# Patient Record
Sex: FEMALE | Race: WHITE | Employment: OTHER | URBAN - METROPOLITAN AREA
[De-identification: names, ages, dates, MRNs, and addresses within clinical notes are randomized per-mention and may not be internally consistent; named-entity substitution may affect disease eponyms.]

---

## 2017-01-01 ENCOUNTER — HOSPITAL ENCOUNTER (OUTPATIENT)
Dept: NON INVASIVE DIAGNOSTICS | Age: 82
Discharge: HOME | End: 2017-05-12
Payer: MEDICARE

## 2017-01-01 ENCOUNTER — HOSPITAL ENCOUNTER (OUTPATIENT)
Dept: LAB | Age: 82
Discharge: HOME | End: 2017-06-06
Payer: MEDICARE

## 2017-01-01 ENCOUNTER — ANESTHESIA (OUTPATIENT)
Dept: ENDOSCOPY | Age: 82
End: 2017-01-01
Payer: MEDICARE

## 2017-01-01 ENCOUNTER — OFFICE VISIT (OUTPATIENT)
Dept: CARDIOLOGY CLINIC | Age: 82
End: 2017-01-01

## 2017-01-01 ENCOUNTER — ANESTHESIA EVENT (OUTPATIENT)
Dept: ENDOSCOPY | Age: 82
End: 2017-01-01
Payer: MEDICARE

## 2017-01-01 ENCOUNTER — HOSPITAL ENCOUNTER (OUTPATIENT)
Dept: CARDIAC REHAB | Age: 82
Discharge: HOME | End: 2017-05-30
Payer: MEDICARE

## 2017-01-01 ENCOUNTER — HOSPITAL ENCOUNTER (OUTPATIENT)
Age: 82
Setting detail: OUTPATIENT SURGERY
Discharge: HOME | End: 2017-05-04
Attending: INTERNAL MEDICINE | Admitting: INTERNAL MEDICINE
Payer: MEDICARE

## 2017-01-01 ENCOUNTER — TELEPHONE (OUTPATIENT)
Dept: CARDIOLOGY CLINIC | Age: 82
End: 2017-01-01

## 2017-01-01 ENCOUNTER — TELEPHONE (OUTPATIENT)
Dept: CARDIAC REHAB | Age: 82
End: 2017-01-01

## 2017-01-01 ENCOUNTER — HOSPITAL ENCOUNTER (OUTPATIENT)
Dept: CARDIAC REHAB | Age: 82
Discharge: HOME | End: 2017-06-28
Payer: MEDICARE

## 2017-01-01 ENCOUNTER — HOSPITAL ENCOUNTER (OUTPATIENT)
Dept: CARDIAC REHAB | Age: 82
Discharge: HOME | End: 2017-06-14
Payer: MEDICARE

## 2017-01-01 ENCOUNTER — HOSPITAL ENCOUNTER (OUTPATIENT)
Dept: NON INVASIVE DIAGNOSTICS | Age: 82
Discharge: HOME | End: 2017-04-28
Attending: FAMILY MEDICINE

## 2017-01-01 ENCOUNTER — HOSPITAL ENCOUNTER (OUTPATIENT)
Dept: GENERAL RADIOLOGY | Age: 82
Discharge: HOME | DRG: 251 | End: 2017-05-15
Payer: MEDICARE

## 2017-01-01 ENCOUNTER — HOSPITAL ENCOUNTER (INPATIENT)
Dept: CARDIAC CATH/INVASIVE PROCEDURES | Age: 82
LOS: 2 days | Discharge: HOME | DRG: 251 | End: 2017-05-19
Attending: INTERNAL MEDICINE | Admitting: INTERNAL MEDICINE
Payer: MEDICARE

## 2017-01-01 ENCOUNTER — HOSPITAL ENCOUNTER (OUTPATIENT)
Dept: CARDIAC REHAB | Age: 82
Discharge: HOME | End: 2017-06-07
Payer: MEDICARE

## 2017-01-01 ENCOUNTER — HOSPITAL ENCOUNTER (OUTPATIENT)
Dept: CARDIAC REHAB | Age: 82
Discharge: HOME | End: 2017-06-05
Payer: MEDICARE

## 2017-01-01 ENCOUNTER — HOSPITAL ENCOUNTER (OUTPATIENT)
Dept: CARDIAC REHAB | Age: 82
Discharge: HOME | End: 2017-06-12
Payer: MEDICARE

## 2017-01-01 ENCOUNTER — HOSPITAL ENCOUNTER (OUTPATIENT)
Dept: CT IMAGING | Age: 82
Discharge: HOME | End: 2017-03-06
Attending: OBSTETRICS & GYNECOLOGY
Payer: MEDICARE

## 2017-01-01 ENCOUNTER — HOSPITAL ENCOUNTER (OUTPATIENT)
Dept: CARDIAC REHAB | Age: 82
Discharge: HOME | End: 2017-06-19
Payer: MEDICARE

## 2017-01-01 ENCOUNTER — TELEPHONE (OUTPATIENT)
Dept: PULMONOLOGY | Age: 82
End: 2017-01-01

## 2017-01-01 ENCOUNTER — SURGERY (OUTPATIENT)
Age: 82
End: 2017-01-01

## 2017-01-01 ENCOUNTER — HOSPITAL ENCOUNTER (OUTPATIENT)
Dept: LAB | Age: 82
Discharge: HOME | DRG: 251 | End: 2017-05-15
Payer: MEDICARE

## 2017-01-01 ENCOUNTER — HOSPITAL ENCOUNTER (OUTPATIENT)
Dept: CARDIAC REHAB | Age: 82
Discharge: HOME | End: 2017-06-21
Payer: MEDICARE

## 2017-01-01 ENCOUNTER — HOSPITAL ENCOUNTER (OUTPATIENT)
Dept: CARDIAC REHAB | Age: 82
Discharge: HOME | End: 2017-06-26
Payer: MEDICARE

## 2017-01-01 VITALS
DIASTOLIC BLOOD PRESSURE: 64 MMHG | WEIGHT: 145 LBS | HEIGHT: 62 IN | BODY MASS INDEX: 26.68 KG/M2 | OXYGEN SATURATION: 96 % | SYSTOLIC BLOOD PRESSURE: 130 MMHG | HEART RATE: 81 BPM

## 2017-01-01 VITALS
WEIGHT: 144 LBS | DIASTOLIC BLOOD PRESSURE: 68 MMHG | HEART RATE: 58 BPM | OXYGEN SATURATION: 98 % | HEIGHT: 62 IN | BODY MASS INDEX: 26.5 KG/M2 | SYSTOLIC BLOOD PRESSURE: 108 MMHG

## 2017-01-01 VITALS
HEIGHT: 62 IN | HEART RATE: 70 BPM | DIASTOLIC BLOOD PRESSURE: 64 MMHG | TEMPERATURE: 98.7 F | RESPIRATION RATE: 18 BRPM | OXYGEN SATURATION: 92 % | WEIGHT: 154.2 LBS | BODY MASS INDEX: 28.37 KG/M2 | SYSTOLIC BLOOD PRESSURE: 100 MMHG

## 2017-01-01 VITALS
WEIGHT: 141 LBS | BODY MASS INDEX: 25.95 KG/M2 | OXYGEN SATURATION: 100 % | HEIGHT: 62 IN | SYSTOLIC BLOOD PRESSURE: 133 MMHG | RESPIRATION RATE: 19 BRPM | TEMPERATURE: 98.1 F | HEART RATE: 63 BPM | DIASTOLIC BLOOD PRESSURE: 58 MMHG

## 2017-01-01 DIAGNOSIS — C56.1 OVARIAN CANCER ON RIGHT (HCC): ICD-10-CM

## 2017-01-01 DIAGNOSIS — R94.31 ABNORMAL EKG: ICD-10-CM

## 2017-01-01 DIAGNOSIS — R94.39 ABNORMAL NUCLEAR STRESS TEST: ICD-10-CM

## 2017-01-01 DIAGNOSIS — I42.9 CARDIOMYOPATHY, UNSPECIFIED TYPE (HCC): ICD-10-CM

## 2017-01-01 DIAGNOSIS — I10 ESSENTIAL HYPERTENSION: ICD-10-CM

## 2017-01-01 DIAGNOSIS — R06.09 DYSPNEA ON EXERTION: ICD-10-CM

## 2017-01-01 DIAGNOSIS — Z01.812 PRE-PROCEDURE LAB EXAM: ICD-10-CM

## 2017-01-01 DIAGNOSIS — I25.5 ISCHEMIC CARDIOMYOPATHY: ICD-10-CM

## 2017-01-01 DIAGNOSIS — I20.0 UNSTABLE ANGINA (HCC): Primary | ICD-10-CM

## 2017-01-01 DIAGNOSIS — I42.9 CARDIOMYOPATHY, UNSPECIFIED TYPE (HCC): Primary | ICD-10-CM

## 2017-01-01 DIAGNOSIS — I25.10 CORONARY ARTERY DISEASE INVOLVING NATIVE HEART, ANGINA PRESENCE UNSPECIFIED, UNSPECIFIED VESSEL OR LESION TYPE: ICD-10-CM

## 2017-01-01 DIAGNOSIS — R07.9 CHEST PAIN, EXERTIONAL: ICD-10-CM

## 2017-01-01 DIAGNOSIS — I20.0 UNSTABLE ANGINA (HCC): ICD-10-CM

## 2017-01-01 DIAGNOSIS — R19.04 ABDOMINAL OR PELVIC SWELLING, MASS, OR LUMP, LEFT LOWER QUADRANT: ICD-10-CM

## 2017-01-01 DIAGNOSIS — R94.39 ABNORMAL NUCLEAR STRESS TEST: Primary | ICD-10-CM

## 2017-01-01 DIAGNOSIS — E78.5 HYPERLIPIDEMIA, UNSPECIFIED HYPERLIPIDEMIA TYPE: ICD-10-CM

## 2017-01-01 DIAGNOSIS — I25.118 CORONARY ARTERY DISEASE OF NATIVE ARTERY OF NATIVE HEART WITH STABLE ANGINA PECTORIS (HCC): ICD-10-CM

## 2017-01-01 LAB
ALBUMIN SERPL BCP-MCNC: 3.9 G/DL (ref 3.4–5)
ALBUMIN/GLOB SERPL: 1.2 {RATIO} (ref 0.8–1.7)
ALP SERPL-CCNC: 86 U/L (ref 45–117)
ALT SERPL-CCNC: 20 U/L (ref 13–56)
ANION GAP BLD CALC-SCNC: 10 MMOL/L (ref 3–18)
ANION GAP BLD CALC-SCNC: 10 MMOL/L (ref 3–18)
ANION GAP BLD CALC-SCNC: 11 MMOL/L (ref 3–18)
ANION GAP BLD CALC-SCNC: 7 MMOL/L (ref 3–18)
ANION GAP BLD CALC-SCNC: 8 MMOL/L (ref 3–18)
APTT PPP: 53 SEC (ref 23–36.4)
APTT PPP: 95.2 SEC (ref 23–36.4)
APTT PPP: 95.9 SEC (ref 23–36.4)
AST SERPL W P-5'-P-CCNC: 23 U/L (ref 15–37)
ATRIAL RATE: 67 BPM
ATRIAL RATE: 70 BPM
ATRIAL RATE: 91 BPM
ATTENDING PHYSICIAN, CST07: NORMAL
BASOPHILS # BLD AUTO: 0 K/UL (ref 0–0.06)
BASOPHILS # BLD AUTO: 0.1 K/UL (ref 0–0.1)
BASOPHILS # BLD: 0 % (ref 0–2)
BASOPHILS # BLD: 1 % (ref 0–2)
BILIRUB SERPL-MCNC: 0.5 MG/DL (ref 0.2–1)
BUN SERPL-MCNC: 15 MG/DL (ref 7–18)
BUN SERPL-MCNC: 20 MG/DL (ref 7–18)
BUN SERPL-MCNC: 21 MG/DL (ref 7–18)
BUN SERPL-MCNC: 22 MG/DL (ref 7–18)
BUN SERPL-MCNC: 25 MG/DL (ref 7–18)
BUN/CREAT SERPL: 15 (ref 12–20)
BUN/CREAT SERPL: 16 (ref 12–20)
BUN/CREAT SERPL: 17 (ref 12–20)
CALCIUM SERPL-MCNC: 9 MG/DL (ref 8.5–10.1)
CALCIUM SERPL-MCNC: 9.3 MG/DL (ref 8.5–10.1)
CALCIUM SERPL-MCNC: 9.3 MG/DL (ref 8.5–10.1)
CALCIUM SERPL-MCNC: 9.7 MG/DL (ref 8.5–10.1)
CALCIUM SERPL-MCNC: 9.9 MG/DL (ref 8.5–10.1)
CALCULATED P AXIS, ECG09: 39 DEGREES
CALCULATED P AXIS, ECG09: 46 DEGREES
CALCULATED P AXIS, ECG09: 46 DEGREES
CALCULATED R AXIS, ECG10: 29 DEGREES
CALCULATED R AXIS, ECG10: 65 DEGREES
CALCULATED R AXIS, ECG10: 7 DEGREES
CALCULATED T AXIS, ECG11: -83 DEGREES
CALCULATED T AXIS, ECG11: 151 DEGREES
CALCULATED T AXIS, ECG11: 154 DEGREES
CHLORIDE SERPL-SCNC: 104 MMOL/L (ref 100–108)
CHLORIDE SERPL-SCNC: 105 MMOL/L (ref 100–108)
CHLORIDE SERPL-SCNC: 107 MMOL/L (ref 100–108)
CHLORIDE SERPL-SCNC: 108 MMOL/L (ref 100–108)
CHLORIDE SERPL-SCNC: 109 MMOL/L (ref 100–108)
CHOLEST SERPL-MCNC: 198 MG/DL
CO2 SERPL-SCNC: 19 MMOL/L (ref 21–32)
CO2 SERPL-SCNC: 22 MMOL/L (ref 21–32)
CO2 SERPL-SCNC: 24 MMOL/L (ref 21–32)
CO2 SERPL-SCNC: 25 MMOL/L (ref 21–32)
CO2 SERPL-SCNC: 26 MMOL/L (ref 21–32)
CREAT SERPL-MCNC: 1.01 MG/DL (ref 0.6–1.3)
CREAT SERPL-MCNC: 1.23 MG/DL (ref 0.6–1.3)
CREAT SERPL-MCNC: 1.28 MG/DL (ref 0.6–1.3)
CREAT SERPL-MCNC: 1.4 MG/DL (ref 0.6–1.3)
CREAT SERPL-MCNC: 1.43 MG/DL (ref 0.6–1.3)
CREAT UR-MCNC: 1.2 MG/DL (ref 0.6–1.3)
DIAGNOSIS, 93000: NORMAL
DIFFERENTIAL METHOD BLD: ABNORMAL
DIFFERENTIAL METHOD BLD: ABNORMAL
DUKE TM SCORE RESULT, CST14: NORMAL
DUKE TREADMILL SCORE, CST13: NORMAL
ECG INTERP BEFORE EX, CST11: NORMAL
ECG INTERP DURING EX, CST12: NORMAL
EOSINOPHIL # BLD: 0 K/UL (ref 0–0.4)
EOSINOPHIL # BLD: 0.2 K/UL (ref 0–0.4)
EOSINOPHIL NFR BLD: 0 % (ref 0–5)
EOSINOPHIL NFR BLD: 3 % (ref 0–5)
ERYTHROCYTE [DISTWIDTH] IN BLOOD BY AUTOMATED COUNT: 14.8 % (ref 11.6–14.5)
ERYTHROCYTE [DISTWIDTH] IN BLOOD BY AUTOMATED COUNT: 14.9 % (ref 11.6–14.5)
ERYTHROCYTE [DISTWIDTH] IN BLOOD BY AUTOMATED COUNT: 15 % (ref 11.6–14.5)
ERYTHROCYTE [DISTWIDTH] IN BLOOD BY AUTOMATED COUNT: 15.2 % (ref 11.6–14.5)
FUNCTIONAL CAPACITY, CST17: NORMAL
GLOBULIN SER CALC-MCNC: 3.3 G/DL (ref 2–4)
GLUCOSE SERPL-MCNC: 104 MG/DL (ref 74–99)
GLUCOSE SERPL-MCNC: 111 MG/DL (ref 74–99)
GLUCOSE SERPL-MCNC: 123 MG/DL (ref 74–99)
GLUCOSE SERPL-MCNC: 88 MG/DL (ref 74–99)
GLUCOSE SERPL-MCNC: 89 MG/DL (ref 74–99)
HCT VFR BLD AUTO: 34.4 % (ref 35–45)
HCT VFR BLD AUTO: 36 % (ref 35–45)
HCT VFR BLD AUTO: 38.1 % (ref 35–45)
HCT VFR BLD AUTO: 38.9 % (ref 35–45)
HDLC SERPL-MCNC: 58 MG/DL (ref 40–60)
HDLC SERPL: 3.4 {RATIO} (ref 0–5)
HGB BLD-MCNC: 11.6 G/DL (ref 12–16)
HGB BLD-MCNC: 12.2 G/DL (ref 12–16)
HGB BLD-MCNC: 12.9 G/DL (ref 12–16)
HGB BLD-MCNC: 13.2 G/DL (ref 12–16)
INR PPP: 1 (ref 0.8–1.2)
KNOWN CARDIAC CONDITION, CST08: NORMAL
LDLC SERPL CALC-MCNC: 127.4 MG/DL (ref 0–100)
LIPID PROFILE,FLP: ABNORMAL
LYMPHOCYTES # BLD AUTO: 10 % (ref 21–52)
LYMPHOCYTES # BLD AUTO: 27 % (ref 21–52)
LYMPHOCYTES # BLD: 0.8 K/UL (ref 0.9–3.6)
LYMPHOCYTES # BLD: 1.4 K/UL (ref 0.9–3.6)
MAGNESIUM SERPL-MCNC: 2.4 MG/DL (ref 1.6–2.6)
MAX. DIASTOLIC BP, CST04: 84 MMHG
MAX. HEART RATE, CST05: 131 BPM
MAX. SYSTOLIC BP, CST03: 160 MMHG
MCH RBC QN AUTO: 30.1 PG (ref 24–34)
MCH RBC QN AUTO: 30.2 PG (ref 24–34)
MCH RBC QN AUTO: 30.2 PG (ref 24–34)
MCH RBC QN AUTO: 30.3 PG (ref 24–34)
MCHC RBC AUTO-ENTMCNC: 33.7 G/DL (ref 31–37)
MCHC RBC AUTO-ENTMCNC: 33.9 G/DL (ref 31–37)
MCV RBC AUTO: 89.1 FL (ref 74–97)
MCV RBC AUTO: 89.1 FL (ref 74–97)
MCV RBC AUTO: 89.2 FL (ref 74–97)
MCV RBC AUTO: 89.2 FL (ref 74–97)
MONOCYTES # BLD: 0.4 K/UL (ref 0.05–1.2)
MONOCYTES # BLD: 0.4 K/UL (ref 0.05–1.2)
MONOCYTES NFR BLD AUTO: 5 % (ref 3–10)
MONOCYTES NFR BLD AUTO: 8 % (ref 3–10)
NEUTS SEG # BLD: 3.2 K/UL (ref 1.8–8)
NEUTS SEG # BLD: 6.8 K/UL (ref 1.8–8)
NEUTS SEG NFR BLD AUTO: 61 % (ref 40–73)
NEUTS SEG NFR BLD AUTO: 85 % (ref 40–73)
OVERALL BP RESPONSE TO EXERCISE, CST16: NORMAL
OVERALL HR RESPONSE TO EXERCISE, CST15: NORMAL
P-R INTERVAL, ECG05: 184 MS
P-R INTERVAL, ECG05: 188 MS
P-R INTERVAL, ECG05: 190 MS
PEAK EX METS, CST10: 1.5 METS
PLATELET # BLD AUTO: 123 K/UL (ref 135–420)
PLATELET # BLD AUTO: 133 K/UL (ref 135–420)
PLATELET # BLD AUTO: 144 K/UL (ref 135–420)
PLATELET # BLD AUTO: 167 K/UL (ref 135–420)
PMV BLD AUTO: 13.1 FL (ref 9.2–11.8)
PMV BLD AUTO: 13.4 FL (ref 9.2–11.8)
PMV BLD AUTO: 13.8 FL (ref 9.2–11.8)
PMV BLD AUTO: 13.9 FL (ref 9.2–11.8)
POTASSIUM SERPL-SCNC: 3.6 MMOL/L (ref 3.5–5.5)
POTASSIUM SERPL-SCNC: 3.7 MMOL/L (ref 3.5–5.5)
POTASSIUM SERPL-SCNC: 4.1 MMOL/L (ref 3.5–5.5)
POTASSIUM SERPL-SCNC: 4.4 MMOL/L (ref 3.5–5.5)
POTASSIUM SERPL-SCNC: 4.7 MMOL/L (ref 3.5–5.5)
PROT SERPL-MCNC: 7.2 G/DL (ref 6.4–8.2)
PROTHROMBIN TIME: 13.3 SEC (ref 11.5–15.2)
PROTOCOL NAME, CST01: NORMAL
Q-T INTERVAL, ECG07: 368 MS
Q-T INTERVAL, ECG07: 400 MS
Q-T INTERVAL, ECG07: 414 MS
QRS DURATION, ECG06: 136 MS
QRS DURATION, ECG06: 90 MS
QRS DURATION, ECG06: 94 MS
QTC CALCULATION (BEZET), ECG08: 432 MS
QTC CALCULATION (BEZET), ECG08: 437 MS
QTC CALCULATION (BEZET), ECG08: 452 MS
RBC # BLD AUTO: 3.86 M/UL (ref 4.2–5.3)
RBC # BLD AUTO: 4.04 M/UL (ref 4.2–5.3)
RBC # BLD AUTO: 4.27 M/UL (ref 4.2–5.3)
RBC # BLD AUTO: 4.36 M/UL (ref 4.2–5.3)
SODIUM SERPL-SCNC: 135 MMOL/L (ref 136–145)
SODIUM SERPL-SCNC: 138 MMOL/L (ref 136–145)
SODIUM SERPL-SCNC: 140 MMOL/L (ref 136–145)
SODIUM SERPL-SCNC: 140 MMOL/L (ref 136–145)
SODIUM SERPL-SCNC: 142 MMOL/L (ref 136–145)
TEST INDICATION, CST09: NORMAL
TRIGL SERPL-MCNC: 63 MG/DL (ref ?–150)
VENTRICULAR RATE, ECG03: 67 BPM
VENTRICULAR RATE, ECG03: 70 BPM
VENTRICULAR RATE, ECG03: 91 BPM
VLDLC SERPL CALC-MCNC: 12.6 MG/DL
WBC # BLD AUTO: 5.3 K/UL (ref 4.6–13.2)
WBC # BLD AUTO: 8 K/UL (ref 4.6–13.2)
WBC # BLD AUTO: 8 K/UL (ref 4.6–13.2)
WBC # BLD AUTO: 8.1 K/UL (ref 4.6–13.2)

## 2017-01-01 PROCEDURE — C1769 GUIDE WIRE: HCPCS

## 2017-01-01 PROCEDURE — 92920 PRQ TRLUML C ANGIOP 1ART&/BR: CPT

## 2017-01-01 PROCEDURE — 74011250636 HC RX REV CODE- 250/636: Performed by: INTERNAL MEDICINE

## 2017-01-01 PROCEDURE — 85730 THROMBOPLASTIN TIME PARTIAL: CPT | Performed by: PHYSICIAN ASSISTANT

## 2017-01-01 PROCEDURE — 93458 L HRT ARTERY/VENTRICLE ANGIO: CPT

## 2017-01-01 PROCEDURE — 93798 PHYS/QHP OP CAR RHAB W/ECG: CPT

## 2017-01-01 PROCEDURE — 80048 BASIC METABOLIC PNL TOTAL CA: CPT | Performed by: INTERNAL MEDICINE

## 2017-01-01 PROCEDURE — 74011000250 HC RX REV CODE- 250

## 2017-01-01 PROCEDURE — 65660000004 HC RM CVT STEPDOWN

## 2017-01-01 PROCEDURE — 36415 COLL VENOUS BLD VENIPUNCTURE: CPT | Performed by: PHYSICIAN ASSISTANT

## 2017-01-01 PROCEDURE — B2111ZZ FLUOROSCOPY OF MULTIPLE CORONARY ARTERIES USING LOW OSMOLAR CONTRAST: ICD-10-PCS | Performed by: INTERNAL MEDICINE

## 2017-01-01 PROCEDURE — 4A023N7 MEASUREMENT OF CARDIAC SAMPLING AND PRESSURE, LEFT HEART, PERCUTANEOUS APPROACH: ICD-10-PCS | Performed by: INTERNAL MEDICINE

## 2017-01-01 PROCEDURE — 93797 PHYS/QHP OP CAR RHAB WO ECG: CPT

## 2017-01-01 PROCEDURE — 77030013797 HC KT TRNSDUC PRSSR EDWD -A

## 2017-01-01 PROCEDURE — 93005 ELECTROCARDIOGRAM TRACING: CPT

## 2017-01-01 PROCEDURE — C1887 CATHETER, GUIDING: HCPCS

## 2017-01-01 PROCEDURE — 74011000258 HC RX REV CODE- 258: Performed by: INTERNAL MEDICINE

## 2017-01-01 PROCEDURE — 77030028790 HC ACC ST CTRL VLV COPLT ABBT -B

## 2017-01-01 PROCEDURE — 77030020643 HC SYR ANGI PWR INJ COVD -A

## 2017-01-01 PROCEDURE — 74011250636 HC RX REV CODE- 250/636: Performed by: PHYSICIAN ASSISTANT

## 2017-01-01 PROCEDURE — 74011250637 HC RX REV CODE- 250/637: Performed by: PHYSICIAN ASSISTANT

## 2017-01-01 PROCEDURE — 85027 COMPLETE CBC AUTOMATED: CPT | Performed by: PHYSICIAN ASSISTANT

## 2017-01-01 PROCEDURE — 77030009426 HC FCPS BIOP ENDOSC BSC -B: Performed by: INTERNAL MEDICINE

## 2017-01-01 PROCEDURE — 77030004558 HC CATH ANGI DX SUPR TORQ CARD -A

## 2017-01-01 PROCEDURE — 74011250637 HC RX REV CODE- 250/637: Performed by: INTERNAL MEDICINE

## 2017-01-01 PROCEDURE — 74011250636 HC RX REV CODE- 250/636: Performed by: NURSE ANESTHETIST, CERTIFIED REGISTERED

## 2017-01-01 PROCEDURE — 74011000250 HC RX REV CODE- 250: Performed by: INTERNAL MEDICINE

## 2017-01-01 PROCEDURE — 80061 LIPID PANEL: CPT | Performed by: PHYSICIAN ASSISTANT

## 2017-01-01 PROCEDURE — 36415 COLL VENOUS BLD VENIPUNCTURE: CPT | Performed by: INTERNAL MEDICINE

## 2017-01-01 PROCEDURE — C1725 CATH, TRANSLUMIN NON-LASER: HCPCS

## 2017-01-01 PROCEDURE — 99218 HC RM OBSERVATION: CPT

## 2017-01-01 PROCEDURE — 76060000031 HC ANESTHESIA FIRST 0.5 HR: Performed by: INTERNAL MEDICINE

## 2017-01-01 PROCEDURE — 74011250636 HC RX REV CODE- 250/636

## 2017-01-01 PROCEDURE — 99153 MOD SED SAME PHYS/QHP EA: CPT

## 2017-01-01 PROCEDURE — 74011636320 HC RX REV CODE- 636/320: Performed by: OBSTETRICS & GYNECOLOGY

## 2017-01-01 PROCEDURE — 99152 MOD SED SAME PHYS/QHP 5/>YRS: CPT

## 2017-01-01 PROCEDURE — 74177 CT ABD & PELVIS W/CONTRAST: CPT

## 2017-01-01 PROCEDURE — 88305 TISSUE EXAM BY PATHOLOGIST: CPT | Performed by: INTERNAL MEDICINE

## 2017-01-01 PROCEDURE — B2151ZZ FLUOROSCOPY OF LEFT HEART USING LOW OSMOLAR CONTRAST: ICD-10-PCS | Performed by: INTERNAL MEDICINE

## 2017-01-01 PROCEDURE — C1760 CLOSURE DEV, VASC: HCPCS

## 2017-01-01 PROCEDURE — A9500 TC99M SESTAMIBI: HCPCS

## 2017-01-01 PROCEDURE — 85730 THROMBOPLASTIN TIME PARTIAL: CPT | Performed by: INTERNAL MEDICINE

## 2017-01-01 PROCEDURE — C1894 INTRO/SHEATH, NON-LASER: HCPCS

## 2017-01-01 PROCEDURE — 83735 ASSAY OF MAGNESIUM: CPT | Performed by: INTERNAL MEDICINE

## 2017-01-01 PROCEDURE — 85025 COMPLETE CBC W/AUTO DIFF WBC: CPT | Performed by: INTERNAL MEDICINE

## 2017-01-01 PROCEDURE — 74011636320 HC RX REV CODE- 636/320: Performed by: INTERNAL MEDICINE

## 2017-01-01 PROCEDURE — 78452 HT MUSCLE IMAGE SPECT MULT: CPT

## 2017-01-01 PROCEDURE — 80048 BASIC METABOLIC PNL TOTAL CA: CPT | Performed by: PHYSICIAN ASSISTANT

## 2017-01-01 PROCEDURE — 02703ZZ DILATION OF CORONARY ARTERY, ONE ARTERY, PERCUTANEOUS APPROACH: ICD-10-PCS | Performed by: INTERNAL MEDICINE

## 2017-01-01 PROCEDURE — 93017 CV STRESS TEST TRACING ONLY: CPT

## 2017-01-01 PROCEDURE — 76040000019: Performed by: INTERNAL MEDICINE

## 2017-01-01 PROCEDURE — 93306 TTE W/DOPPLER COMPLETE: CPT

## 2017-01-01 PROCEDURE — 77030015766

## 2017-01-01 PROCEDURE — 82565 ASSAY OF CREATININE: CPT

## 2017-01-01 RX ORDER — OXYCODONE AND ACETAMINOPHEN 5; 325 MG/1; MG/1
1 TABLET ORAL
Status: DISCONTINUED | OUTPATIENT
Start: 2017-01-01 | End: 2017-01-01 | Stop reason: HOSPADM

## 2017-01-01 RX ORDER — CLONIDINE HYDROCHLORIDE 0.1 MG/1
0.1 TABLET ORAL 2 TIMES DAILY
Status: DISCONTINUED | OUTPATIENT
Start: 2017-01-01 | End: 2017-01-01

## 2017-01-01 RX ORDER — SIMVASTATIN 20 MG/1
20 TABLET, FILM COATED ORAL
Qty: 30 TAB | Refills: 11 | Status: SHIPPED | OUTPATIENT
Start: 2017-01-01 | End: 2017-01-01 | Stop reason: SDUPTHER

## 2017-01-01 RX ORDER — HEPARIN SODIUM 10000 [USP'U]/100ML
12-25 INJECTION, SOLUTION INTRAVENOUS
Status: DISCONTINUED | OUTPATIENT
Start: 2017-01-01 | End: 2017-01-01

## 2017-01-01 RX ORDER — SODIUM CHLORIDE 0.9 % (FLUSH) 0.9 %
5-10 SYRINGE (ML) INJECTION EVERY 8 HOURS
Status: DISCONTINUED | OUTPATIENT
Start: 2017-01-01 | End: 2017-01-01 | Stop reason: HOSPADM

## 2017-01-01 RX ORDER — TORSEMIDE 10 MG/1
10 TABLET ORAL DAILY
COMMUNITY
End: 2017-01-01 | Stop reason: ALTCHOICE

## 2017-01-01 RX ORDER — SODIUM CHLORIDE, SODIUM LACTATE, POTASSIUM CHLORIDE, CALCIUM CHLORIDE 600; 310; 30; 20 MG/100ML; MG/100ML; MG/100ML; MG/100ML
75 INJECTION, SOLUTION INTRAVENOUS CONTINUOUS
Status: DISCONTINUED | OUTPATIENT
Start: 2017-01-01 | End: 2017-01-01 | Stop reason: HOSPADM

## 2017-01-01 RX ORDER — HEPARIN SODIUM 1000 [USP'U]/ML
1000-10000 INJECTION, SOLUTION INTRAVENOUS; SUBCUTANEOUS
Status: DISCONTINUED | OUTPATIENT
Start: 2017-01-01 | End: 2017-01-01 | Stop reason: HOSPADM

## 2017-01-01 RX ORDER — METOPROLOL TARTRATE 50 MG/1
50 TABLET ORAL EVERY 12 HOURS
Qty: 60 TAB | Refills: 11 | Status: SHIPPED | OUTPATIENT
Start: 2017-01-01

## 2017-01-01 RX ORDER — FENTANYL CITRATE 50 UG/ML
12.5-1 INJECTION, SOLUTION INTRAMUSCULAR; INTRAVENOUS
Status: DISCONTINUED | OUTPATIENT
Start: 2017-01-01 | End: 2017-01-01 | Stop reason: HOSPADM

## 2017-01-01 RX ORDER — CLOPIDOGREL BISULFATE 75 MG/1
75 TABLET ORAL DAILY
Qty: 30 TAB | Refills: 11 | Status: SHIPPED | OUTPATIENT
Start: 2017-01-01

## 2017-01-01 RX ORDER — FENTANYL CITRATE 50 UG/ML
INJECTION, SOLUTION INTRAMUSCULAR; INTRAVENOUS
Status: COMPLETED
Start: 2017-01-01 | End: 2017-01-01

## 2017-01-01 RX ORDER — FENTANYL CITRATE 50 UG/ML
25 INJECTION, SOLUTION INTRAMUSCULAR; INTRAVENOUS ONCE
Status: COMPLETED | OUTPATIENT
Start: 2017-01-01 | End: 2017-01-01

## 2017-01-01 RX ORDER — NITROGLYCERIN 400 UG/1
1 SPRAY ORAL
Status: ACTIVE | OUTPATIENT
Start: 2017-01-01 | End: 2017-01-01

## 2017-01-01 RX ORDER — PROPOFOL 10 MG/ML
INJECTION, EMULSION INTRAVENOUS AS NEEDED
Status: DISCONTINUED | OUTPATIENT
Start: 2017-01-01 | End: 2017-01-01 | Stop reason: HOSPADM

## 2017-01-01 RX ORDER — BIVALIRUDIN 250 MG/5ML
0.75 INJECTION, POWDER, LYOPHILIZED, FOR SOLUTION INTRAVENOUS ONCE
Status: COMPLETED | OUTPATIENT
Start: 2017-01-01 | End: 2017-01-01

## 2017-01-01 RX ORDER — CLOPIDOGREL 300 MG/1
600 TABLET, FILM COATED ORAL
Status: DISCONTINUED | OUTPATIENT
Start: 2017-01-01 | End: 2017-01-01 | Stop reason: CLARIF

## 2017-01-01 RX ORDER — VERAPAMIL HYDROCHLORIDE 2.5 MG/ML
2.5-5 INJECTION, SOLUTION INTRAVENOUS ONCE
Status: DISCONTINUED | OUTPATIENT
Start: 2017-01-01 | End: 2017-01-01 | Stop reason: HOSPADM

## 2017-01-01 RX ORDER — HEPARIN SODIUM 10000 [USP'U]/100ML
12-25 INJECTION, SOLUTION INTRAVENOUS
Status: DISCONTINUED | OUTPATIENT
Start: 2017-01-01 | End: 2017-01-01 | Stop reason: SDUPTHER

## 2017-01-01 RX ORDER — SIMVASTATIN 20 MG/1
20 TABLET, FILM COATED ORAL
Status: DISCONTINUED | OUTPATIENT
Start: 2017-01-01 | End: 2017-01-01 | Stop reason: HOSPADM

## 2017-01-01 RX ORDER — METOPROLOL TARTRATE 50 MG/1
50 TABLET ORAL EVERY 12 HOURS
Status: DISCONTINUED | OUTPATIENT
Start: 2017-01-01 | End: 2017-01-01 | Stop reason: HOSPADM

## 2017-01-01 RX ORDER — METOPROLOL SUCCINATE 50 MG/1
50 TABLET, EXTENDED RELEASE ORAL 2 TIMES DAILY
Status: DISCONTINUED | OUTPATIENT
Start: 2017-01-01 | End: 2017-01-01

## 2017-01-01 RX ORDER — MIDAZOLAM HYDROCHLORIDE 1 MG/ML
1 INJECTION, SOLUTION INTRAMUSCULAR; INTRAVENOUS
Status: DISCONTINUED | OUTPATIENT
Start: 2017-01-01 | End: 2017-01-01 | Stop reason: HOSPADM

## 2017-01-01 RX ORDER — PROPOFOL 10 MG/ML
INJECTION, EMULSION INTRAVENOUS
Status: DISCONTINUED | OUTPATIENT
Start: 2017-01-01 | End: 2017-01-01 | Stop reason: HOSPADM

## 2017-01-01 RX ORDER — NITROGLYCERIN 0.4 MG/1
0.4 TABLET SUBLINGUAL
Qty: 1 BOTTLE | Refills: 2 | Status: SHIPPED | OUTPATIENT
Start: 2017-01-01

## 2017-01-01 RX ORDER — BIVALIRUDIN 250 MG/5ML
INJECTION, POWDER, LYOPHILIZED, FOR SOLUTION INTRAVENOUS
Status: COMPLETED
Start: 2017-01-01 | End: 2017-01-01

## 2017-01-01 RX ORDER — RANITIDINE 150 MG/1
150 TABLET, FILM COATED ORAL 2 TIMES DAILY
Status: DISCONTINUED | OUTPATIENT
Start: 2017-01-01 | End: 2017-01-01 | Stop reason: HOSPADM

## 2017-01-01 RX ORDER — ASPIRIN 81 MG/1
81 TABLET ORAL DAILY
Status: DISCONTINUED | OUTPATIENT
Start: 2017-01-01 | End: 2017-01-01 | Stop reason: HOSPADM

## 2017-01-01 RX ORDER — SODIUM CHLORIDE 0.9 % (FLUSH) 0.9 %
10 SYRINGE (ML) INJECTION AS NEEDED
Status: COMPLETED | OUTPATIENT
Start: 2017-01-01 | End: 2017-01-01

## 2017-01-01 RX ORDER — SODIUM CHLORIDE 0.9 % (FLUSH) 0.9 %
5-10 SYRINGE (ML) INJECTION AS NEEDED
Status: DISCONTINUED | OUTPATIENT
Start: 2017-01-01 | End: 2017-01-01 | Stop reason: HOSPADM

## 2017-01-01 RX ORDER — ISRADIPINE 2.5 MG/1
5 CAPSULE ORAL DAILY
Status: DISCONTINUED | OUTPATIENT
Start: 2017-01-01 | End: 2017-01-01 | Stop reason: HOSPADM

## 2017-01-01 RX ORDER — CLOPIDOGREL 300 MG/1
300 TABLET, FILM COATED ORAL ONCE
Status: COMPLETED | OUTPATIENT
Start: 2017-01-01 | End: 2017-01-01

## 2017-01-01 RX ORDER — FUROSEMIDE 20 MG/1
TABLET ORAL
Qty: 30 TAB | Refills: 6 | Status: SHIPPED | OUTPATIENT
Start: 2017-01-01

## 2017-01-01 RX ORDER — SODIUM CHLORIDE 450 MG/100ML
150 INJECTION, SOLUTION INTRAVENOUS CONTINUOUS
Status: DISPENSED | OUTPATIENT
Start: 2017-01-01 | End: 2017-01-01

## 2017-01-01 RX ORDER — METOPROLOL SUCCINATE 50 MG/1
50 TABLET, EXTENDED RELEASE ORAL
Status: COMPLETED | OUTPATIENT
Start: 2017-01-01 | End: 2017-01-01

## 2017-01-01 RX ORDER — METOPROLOL SUCCINATE 50 MG/1
50 TABLET, EXTENDED RELEASE ORAL EVERY 12 HOURS
Status: DISCONTINUED | OUTPATIENT
Start: 2017-01-01 | End: 2017-01-01

## 2017-01-01 RX ORDER — LEVOTHYROXINE SODIUM 25 UG/1
25 TABLET ORAL
Status: DISCONTINUED | OUTPATIENT
Start: 2017-01-01 | End: 2017-01-01 | Stop reason: HOSPADM

## 2017-01-01 RX ORDER — LIDOCAINE HYDROCHLORIDE 10 MG/ML
1-30 INJECTION, SOLUTION EPIDURAL; INFILTRATION; INTRACAUDAL; PERINEURAL
Status: DISCONTINUED | OUTPATIENT
Start: 2017-01-01 | End: 2017-01-01 | Stop reason: HOSPADM

## 2017-01-01 RX ORDER — CLOPIDOGREL BISULFATE 75 MG/1
75 TABLET ORAL DAILY
Status: DISCONTINUED | OUTPATIENT
Start: 2017-01-01 | End: 2017-01-01 | Stop reason: HOSPADM

## 2017-01-01 RX ORDER — SODIUM CHLORIDE 9 MG/ML
500 INJECTION, SOLUTION INTRAVENOUS ONCE
Status: COMPLETED | OUTPATIENT
Start: 2017-01-01 | End: 2017-01-01

## 2017-01-01 RX ORDER — NITROGLYCERIN 40 MG/100ML
INJECTION INTRAVENOUS
Status: COMPLETED
Start: 2017-01-01 | End: 2017-01-01

## 2017-01-01 RX ORDER — GLYCOPYRROLATE 0.2 MG/ML
INJECTION INTRAMUSCULAR; INTRAVENOUS AS NEEDED
Status: DISCONTINUED | OUTPATIENT
Start: 2017-01-01 | End: 2017-01-01 | Stop reason: HOSPADM

## 2017-01-01 RX ORDER — FUROSEMIDE 20 MG/1
TABLET ORAL
Qty: 15 TAB | Refills: 6 | Status: SHIPPED | OUTPATIENT
Start: 2017-01-01 | End: 2017-01-01 | Stop reason: SDUPTHER

## 2017-01-01 RX ORDER — HEPARIN SODIUM 200 [USP'U]/100ML
500 INJECTION, SOLUTION INTRAVENOUS ONCE
Status: COMPLETED | OUTPATIENT
Start: 2017-01-01 | End: 2017-01-01

## 2017-01-01 RX ORDER — MIDAZOLAM HYDROCHLORIDE 1 MG/ML
.5-2 INJECTION, SOLUTION INTRAMUSCULAR; INTRAVENOUS
Status: DISCONTINUED | OUTPATIENT
Start: 2017-01-01 | End: 2017-01-01 | Stop reason: HOSPADM

## 2017-01-01 RX ORDER — ASPIRIN 81 MG/1
81 TABLET ORAL DAILY
Status: DISCONTINUED | OUTPATIENT
Start: 2017-01-01 | End: 2017-01-01 | Stop reason: SDUPTHER

## 2017-01-01 RX ORDER — NITROGLYCERIN 40 MG/100ML
5-20 INJECTION INTRAVENOUS
Status: DISCONTINUED | OUTPATIENT
Start: 2017-01-01 | End: 2017-01-01

## 2017-01-01 RX ORDER — SIMVASTATIN 20 MG/1
20 TABLET, FILM COATED ORAL
Qty: 30 TAB | Refills: 11 | Status: SHIPPED | OUTPATIENT
Start: 2017-01-01

## 2017-01-01 RX ORDER — NITROGLYCERIN 40 MG/100ML
5 INJECTION INTRAVENOUS CONTINUOUS
Status: DISCONTINUED | OUTPATIENT
Start: 2017-01-01 | End: 2017-01-01

## 2017-01-01 RX ORDER — FAMOTIDINE 10 MG/ML
20 INJECTION INTRAVENOUS ONCE
Status: DISCONTINUED | OUTPATIENT
Start: 2017-01-01 | End: 2017-01-01 | Stop reason: HOSPADM

## 2017-01-01 RX ADMIN — HEPARIN SODIUM 1000 UNITS: 200 INJECTION, SOLUTION INTRAVENOUS at 11:30

## 2017-01-01 RX ADMIN — CLOPIDOGREL BISULFATE 75 MG: 75 TABLET ORAL at 09:22

## 2017-01-01 RX ADMIN — SODIUM CHLORIDE 150 ML: 450 INJECTION, SOLUTION INTRAVENOUS at 14:16

## 2017-01-01 RX ADMIN — GLYCOPYRROLATE 0.2 MG: 0.2 INJECTION INTRAMUSCULAR; INTRAVENOUS at 14:54

## 2017-01-01 RX ADMIN — FENTANYL CITRATE 25 MCG: 50 INJECTION INTRAMUSCULAR; INTRAVENOUS at 12:28

## 2017-01-01 RX ADMIN — OXYCODONE HYDROCHLORIDE AND ACETAMINOPHEN 1 TABLET: 5; 325 TABLET ORAL at 18:44

## 2017-01-01 RX ADMIN — MIDAZOLAM HYDROCHLORIDE 1 MG: 1 INJECTION, SOLUTION INTRAMUSCULAR; INTRAVENOUS at 12:32

## 2017-01-01 RX ADMIN — FENTANYL CITRATE 25 MCG: 50 INJECTION INTRAMUSCULAR; INTRAVENOUS at 18:26

## 2017-01-01 RX ADMIN — FENTANYL CITRATE 25 MCG: 50 INJECTION INTRAMUSCULAR; INTRAVENOUS at 13:04

## 2017-01-01 RX ADMIN — SODIUM CHLORIDE 500 ML: 900 INJECTION, SOLUTION INTRAVENOUS at 14:26

## 2017-01-01 RX ADMIN — ASPIRIN 81 MG: 81 TABLET, COATED ORAL at 09:09

## 2017-01-01 RX ADMIN — NITROGLYCERIN 5 MCG/MIN: 40 INJECTION INTRAVENOUS at 12:34

## 2017-01-01 RX ADMIN — IOPAMIDOL 70 ML: 612 INJECTION, SOLUTION INTRAVENOUS at 14:00

## 2017-01-01 RX ADMIN — FENTANYL CITRATE 25 MCG: 50 INJECTION, SOLUTION INTRAMUSCULAR; INTRAVENOUS at 18:26

## 2017-01-01 RX ADMIN — FENTANYL CITRATE 25 MCG: 50 INJECTION INTRAMUSCULAR; INTRAVENOUS at 11:28

## 2017-01-01 RX ADMIN — Medication 10 ML: at 06:00

## 2017-01-01 RX ADMIN — MIDAZOLAM HYDROCHLORIDE 1 MG: 1 INJECTION, SOLUTION INTRAMUSCULAR; INTRAVENOUS at 11:19

## 2017-01-01 RX ADMIN — RANITIDINE HYDROCHLORIDE 150 MG: 150 TABLET, FILM COATED ORAL at 17:45

## 2017-01-01 RX ADMIN — BIVALIRUDIN 1.75 MG/KG/HR: 250 INJECTION, POWDER, LYOPHILIZED, FOR SOLUTION INTRAVENOUS at 12:15

## 2017-01-01 RX ADMIN — ISRADIPINE 5 MG: 2.5 CAPSULE ORAL at 09:09

## 2017-01-01 RX ADMIN — Medication 10 ML: at 08:15

## 2017-01-01 RX ADMIN — METOPROLOL TARTRATE 50 MG: 50 TABLET ORAL at 09:22

## 2017-01-01 RX ADMIN — METOPROLOL SUCCINATE 50 MG: 50 TABLET, EXTENDED RELEASE ORAL at 16:57

## 2017-01-01 RX ADMIN — CLONIDINE HYDROCHLORIDE 0.1 MG: 0.1 TABLET ORAL at 09:22

## 2017-01-01 RX ADMIN — MIDAZOLAM HYDROCHLORIDE 1 MG: 1 INJECTION, SOLUTION INTRAMUSCULAR; INTRAVENOUS at 16:57

## 2017-01-01 RX ADMIN — HEPARIN SODIUM 1000 UNITS: 1000 INJECTION, SOLUTION INTRAVENOUS; SUBCUTANEOUS at 12:08

## 2017-01-01 RX ADMIN — BIVALIRUDIN 47.5 MG: 250 INJECTION, POWDER, LYOPHILIZED, FOR SOLUTION INTRAVENOUS at 12:10

## 2017-01-01 RX ADMIN — LIDOCAINE HYDROCHLORIDE 5 ML: 10 INJECTION, SOLUTION EPIDURAL; INFILTRATION; INTRACAUDAL; PERINEURAL at 11:44

## 2017-01-01 RX ADMIN — CLONIDINE HYDROCHLORIDE 0.1 MG: 0.1 TABLET ORAL at 18:44

## 2017-01-01 RX ADMIN — SODIUM CHLORIDE, SODIUM LACTATE, POTASSIUM CHLORIDE, AND CALCIUM CHLORIDE 75 ML/HR: 600; 310; 30; 20 INJECTION, SOLUTION INTRAVENOUS at 12:02

## 2017-01-01 RX ADMIN — IOPAMIDOL 365 ML: 612 INJECTION, SOLUTION INTRAVENOUS at 13:48

## 2017-01-01 RX ADMIN — Medication 10 ML: at 22:00

## 2017-01-01 RX ADMIN — BIVALIRUDIN 47.5 MG: 250 INJECTION, POWDER, LYOPHILIZED, FOR SOLUTION INTRAVENOUS at 13:37

## 2017-01-01 RX ADMIN — HEPARIN SODIUM AND DEXTROSE 12 UNITS/KG/HR: 10000; 5 INJECTION INTRAVENOUS at 15:13

## 2017-01-01 RX ADMIN — ISRADIPINE 5 MG: 2.5 CAPSULE ORAL at 09:23

## 2017-01-01 RX ADMIN — Medication 10 ML: at 14:20

## 2017-01-01 RX ADMIN — METOPROLOL TARTRATE 50 MG: 50 TABLET ORAL at 09:09

## 2017-01-01 RX ADMIN — PROPOFOL 100 MG: 10 INJECTION, EMULSION INTRAVENOUS at 12:38

## 2017-01-01 RX ADMIN — CLOPIDOGREL BISULFATE 75 MG: 75 TABLET ORAL at 09:09

## 2017-01-01 RX ADMIN — MIDAZOLAM HYDROCHLORIDE 1 MG: 1 INJECTION, SOLUTION INTRAMUSCULAR; INTRAVENOUS at 11:30

## 2017-01-01 RX ADMIN — REGADENOSON 0.4 MG: 0.08 INJECTION, SOLUTION INTRAVENOUS at 10:15

## 2017-01-01 RX ADMIN — Medication 10 ML: at 10:15

## 2017-01-01 RX ADMIN — FENTANYL CITRATE 25 MCG: 50 INJECTION INTRAMUSCULAR; INTRAVENOUS at 16:57

## 2017-01-01 RX ADMIN — PROPOFOL 100 MCG/KG/MIN: 10 INJECTION, EMULSION INTRAVENOUS at 14:56

## 2017-01-01 RX ADMIN — ASPIRIN 81 MG: 81 TABLET, COATED ORAL at 09:22

## 2017-01-01 RX ADMIN — Medication 10 ML: at 14:16

## 2017-01-01 RX ADMIN — MIDAZOLAM HYDROCHLORIDE 1 MG: 1 INJECTION, SOLUTION INTRAMUSCULAR; INTRAVENOUS at 13:04

## 2017-01-01 RX ADMIN — PROPOFOL 20 MG: 10 INJECTION, EMULSION INTRAVENOUS at 12:41

## 2017-01-01 RX ADMIN — NITROGLYCERIN 10 MCG/MIN: 40 INJECTION INTRAVENOUS at 19:07

## 2017-01-01 RX ADMIN — BIVALIRUDIN 1.75 MG/KG/HR: 250 INJECTION, POWDER, LYOPHILIZED, FOR SOLUTION INTRAVENOUS at 13:41

## 2017-01-01 RX ADMIN — FENTANYL CITRATE 25 MCG: 50 INJECTION INTRAMUSCULAR; INTRAVENOUS at 12:24

## 2017-01-01 RX ADMIN — CLOPIDOGREL BISULFATE 300 MG: 300 TABLET, FILM COATED ORAL at 15:11

## 2017-01-01 RX ADMIN — LIDOCAINE HYDROCHLORIDE 4 ML: 10 INJECTION, SOLUTION EPIDURAL; INFILTRATION; INTRACAUDAL; PERINEURAL at 11:31

## 2017-01-03 NOTE — ANESTHESIA POSTPROCEDURE EVALUATION
Post-Anesthesia Evaluation and Assessment    Patient: Venu Hampton MRN: [id number]  SSN: [id number]    YOB: 1935  Age: 80 y.o. Sex: female       Cardiovascular Function/Vital Signs  Visit Vitals    /58    Pulse 79    Temp 36.8 °C (98.3 °F)    Resp 14    Ht 5' 3\" (1.6 m)    Wt 66.7 kg (147 lb)    SpO2 98%    Breastfeeding No    BMI 26.04 kg/m2       Patient is status post MAC anesthesia for Procedure(s):  UPPER ENDOSCOPY with biopsies. Nausea/Vomiting: None    Postoperative hydration reviewed and adequate. Pain:  Pain Scale 1: Numeric (0 - 10) (01/03/17 1442)  Pain Intensity 1: 1 (01/03/17 1442)   Managed    Neurological Status: At baseline    Mental Status and Level of Consciousness: Arousable    Pulmonary Status:   O2 Device: Nasal cannula (01/03/17 1501)   Adequate oxygenation and airway patent    Complications related to anesthesia: None    Post-anesthesia assessment completed.  No concerns      Signed By: Bety Mayes MD     January 3, 2017

## 2017-01-03 NOTE — ANESTHESIA PREPROCEDURE EVALUATION
Anesthetic History   No history of anesthetic complications            Review of Systems / Medical History  Patient summary reviewed and pertinent labs reviewed    Pulmonary  Within defined limits                 Neuro/Psych   Within defined limits           Cardiovascular    Hypertension          Hyperlipidemia    Exercise tolerance: >4 METS     GI/Hepatic/Renal     GERD: well controlled           Endo/Other      Hypothyroidism: well controlled       Other Findings   Comments: Non smoker  Pt had flu shot           Physical Exam    Airway  Mallampati: II  TM Distance: 4 - 6 cm  Neck ROM: normal range of motion   Mouth opening: Normal     Cardiovascular  Regular rate and rhythm,  S1 and S2 normal,  no murmur, click, rub, or gallop             Dental  No notable dental hx       Pulmonary  Breath sounds clear to auscultation               Abdominal  GI exam deferred       Other Findings            Anesthetic Plan    ASA: 2  Anesthesia type: MAC      Post-op pain plan if not by surgeon: IV PCA    Induction: Intravenous  Anesthetic plan and risks discussed with: Patient

## 2017-04-28 NOTE — PROGRESS NOTES
Unable to do stress echo due to wall motion abnormality seen on echo. Dr. Torrie Gr office made aware.  Different test will be ordered

## 2017-05-04 NOTE — IP AVS SNAPSHOT
[address] [address] Shahana Goldman [address] [address] [address] [phone] Patient: Adi Gordillo MRN: [id number] You are allergic to the following Allergen Reactions Nexium (Esomeprazole Magnesium) Hives Rash Protonix (Pantoprazole) Hives Rash Recent Documentation Height Weight Breastfeeding? BMI OB Status Smoking Status 1.575 m 64 kg No 25.79 kg/m2 Postmenopausal Never Smoker Emergency Contacts Name Discharge Info Relation Home Work Mobile Julian Berry [employer] DISCHARGE CAREGIVER [3] Spouse [3] [phone] About your hospitalization You were admitted on: May 4, 2017 You last received care in the:  [facility] You were discharged on: May 4, 2017 Unit phone number:  [phone] Why you were hospitalized Your primary diagnosis was:  Not on File Providers Seen During Your Hospitalizations Provider Role Specialty Primary office phone Marycruz Barton MD Attending Provider Gastroenterology [phone] Your Primary Care Physician (PCP) Primary Care Physician Office Phone Office Fax Ketan Blake, [facility] [phone] Follow-up Information Follow up With Details Comments Contact Info Marcial Joseph MD   [facility] 
[phone] Current Discharge Medication List  
  
CONTINUE these medications which have NOT CHANGED Dose & Instructions Dispensing Information Comments Morning Noon Evening Bedtime  
 aspirin delayed-release 81 mg tablet Your last dose was: Your next dose is:    
   
   
 Dose:  81 mg Take 81 mg by mouth daily. Refills:  0  
     
   
   
   
  
 atenolol 50 mg tablet Commonly known as:  TENORMIN Your last dose was: Your next dose is: Take  by mouth daily. Refills:  0  CENTRUM SILVER PO  
   
 Your last dose was: Your next dose is: Take  by mouth. Refills:  0  
     
   
   
   
  
 cloNIDine HCl 0.1 mg tablet Commonly known as:  CATAPRES Your last dose was: Your next dose is: Take  by mouth two (2) times a day. Refills:  0  
     
   
   
   
  
 garlic 8,266 mg Cap Your last dose was: Your next dose is:    
   
   
 Dose:  1 Cap Take 1 Cap by mouth daily. Refills:  0  
     
   
   
   
  
 ibuprofen 200 mg Cap Your last dose was: Your next dose is: Take  by mouth. Refills:  0  
     
   
   
   
  
 isradipine 5 mg capsule Commonly known as:  [facility] CUSHING Your last dose was: Your next dose is:    
   
   
 Dose:  5 mg Take 5 mg by mouth daily. Indications: Hypertension Refills:  0 KRILL OIL PO Your last dose was: Your next dose is:    
   
   
 Dose:  350 mg Take 350 mg by mouth daily. Refills:  0  
     
   
   
   
  
 levothyroxine 25 mcg tablet Commonly known as:  SYNTHROID Your last dose was: Your next dose is: Take  by mouth Daily (before breakfast). Refills:  0  
     
   
   
   
  
 loratadine 10 mg Cap Your last dose was: Your next dose is: Take  by mouth. Refills:  0 MIRALAX 17 gram/dose powder Generic drug:  polyethylene glycol Your last dose was: Your next dose is:    
   
   
 Dose:  17 g Take 17 g by mouth daily. Refills:  0  
     
   
   
   
  
 raNITIdine hcl 150 mg capsule Your last dose was: Your next dose is:    
   
   
 Dose:  300 mg Take 300 mg by mouth two (2) times a day. Quantity:  60 Cap Refills:  12  
     
   
   
   
  
 traMADol 50 mg tablet Commonly known as:  ULTRAM  
   
Your last dose was:     
   
Your next dose is:    
   
   
 Dose:  50 mg  
 Take 50 mg by mouth every six (6) hours as needed for Pain. Refills:  0  
     
   
   
   
  
 VESIcare 5 mg tablet Generic drug:  solifenacin Your last dose was: Your next dose is:    
   
   
 Dose:  5 mg Take 5 mg by mouth daily. Refills:  0  
     
   
   
   
  
 VITAMIN D3 1,000 unit Cap Generic drug:  cholecalciferol Your last dose was: Your next dose is: Take  by mouth. Refills:  0 Discharge Instructions Upper GI Endoscopy: What to Expect at [facility] Your Recovery After you have an endoscopy, you will stay at the hospital or clinic for 1 to 2 hours. This will allow the medicine to wear off. You will be able to go home after your doctor or nurse checks to make sure you are not having any problems. You may have to stay overnight if you had treatment during the test. You may have a sore throat for a day or two after the test. 
This care sheet gives you a general idea about what to expect after the test. 
How can you care for yourself at home? Activity · Rest as much as you need to after you go home. · You should be able to go back to your usual activities the day after the test. 
Diet · Follow your doctor's directions for eating after the test. 
· Drink plenty of fluids (unless your doctor has told you not to). Medications · If you have a sore throat the day after the test, use an over-the-counter spray to numb your throat. Follow-up care is a key part of your treatment and safety. Be sure to make and go to all appointments, and call your doctor if you are having problems. It's also a good idea to know your test results and keep a list of the medicines you take. When should you call for help? Call 911 anytime you think you may need emergency care. For example, call if: 
· You passed out (lost consciousness). · You cough up blood. · You vomit blood or what looks like coffee grounds. · You pass maroon or very bloody stools. Call your doctor now or seek immediate medical care if: 
· You have trouble swallowing. · You have belly pain. · Your stools are black and tarlike or have streaks of blood. · You are sick to your stomach or cannot keep fluids down. Watch closely for changes in your health, and be sure to contact your doctor if: 
· Your throat still hurts after a day or two. · You do not get better as expected. Where can you learn more? Go to [employer] Enter [phone] in the search box to learn more about \"Upper GI Endoscopy: What to Expect at Home. \"  
© 1675-4811 Healthwise, [employer]. Care instructions adapted under license by Merly Love (which disclaims liability or warranty for this information). This care instruction is for use with your licensed healthcare professional. If you have questions about a medical condition or this instruction, always ask your healthcare professional. [address] [address] any warranty or liability for your use of this information. Content Version: 27.2.188075; Current as of: November 14, 2014 DISCHARGE SUMMARY from Nurse POST-PROCEDURE INSTRUCTIONS: 
 
Call your Physician if you: 
? Observe any excess bleeding. ? Develop a temperature over 100.5o F. 
? Experience abdominal, shoulder or chest pain. ? Notice any signs of decreased circulation or nerve impairment to an extremity such as a change in color, persistent numbness, tingling, coldness or increase in pain. ? Vomit blood or you have nausea and vomiting lasting longer than 4 hours. ? Are unable to take medications. ? Are unable to urinate within 8 hours after discharge following general anesthesia or intravenous sedation.  
 
For the next 24 hours after receiving general anesthesia or intravenous sedation, or while taking prescription Narcotics, limit your activities: 
? Do NOT drive a motor vehicle, operate hazard machinery or power tools, or perform tasks that require coordination. The medication you received during your procedure may have some effect on your mental awareness. ? Do NOT make important personal or business decisions. The medication you received during your procedure may have some effect on your mental awareness. ? Do NOT drink alcoholic beverages. These drinks do not mix well with the medications that have been given to you. ? Upon discharge from the hospital, you must be accompanied by a responsible adult. ? Resume your diet as directed by your physician. ? Resume medications as your physician has prescribed. ? Please give a list of your current medications to your Primary Care Provider. ? Please update this list whenever your medications are discontinued, doses are changed, or new medications (including over-the-counter products) are added. ? Please carry medication information at all times in case of emergency situations. These are general instructions for a healthy lifestyle: No smoking/ No tobacco products/ Avoid exposure to second hand smoke. ? Surgeon General's Warning:  Quitting smoking now greatly reduces serious risk to your health. Obesity, smoking, and a sedentary lifestyle greatly increase your risk for illness. ? A healthy diet, regular physical exercise & weight monitoring are important for maintaining a healthy lifestyle ? You may be retaining fluid if you have a history of heart failure or if you experience any of the following symptoms:  Weight gain of 3 pounds or more overnight or 5 pounds in a week, increased swelling in our hands or feet or shortness of breath while lying flat in bed. Please call your doctor as soon as you notice any of these symptoms; do not wait until your next office visit. Recognize signs and symptoms of STROKE: 
F  -  Face looks uneven A  -  Arms unable to move or move unevenly S  -  Speech slurred or non-existent T  -  Time to call 911 - as soon as signs and symptoms begin - DO NOT go back to bed or wait to see If you get better - TIME IS BRAIN. Colorectal Screening ? Colorectal cancer almost always develops from precancerous polyps (abnormal growths) in the colon or rectum. Screening tests can find precancerous polyps, so that they can be removed before they turn into cancer. Screening tests can also find colorectal cancer early, when treatment works best. 
? Speak with your physician about when you should begin screening and how often you should be tested ? Additional Information If you have questions, please call [phone]. Remember, [email or web address] is NOT to be used for urgent needs. For medical emergencies, dial 911. Discharge information has been reviewed with the patient. The patient verbalized understanding. Discharge Orders None Introducing [facility]! [employer] introduces [email or web address] patient portal. Now you can access parts of your medical record, email your doctor's office, and request medication refills online. 1. In your internet browser, go to [email or web address] 2. Click on the First Time User? Click Here link in the Sign In box. You will see the New Member Sign Up page. 3. Enter your [email or web address] Access Code exactly as it appears below. You will not need to use this code after youve completed the sign-up process. If you do not sign up before the expiration date, you must request a new code. · [email or web address] Access Code: [email or web address] Expires: 2017  1:34 PM 
 
4. Enter the last four digits of your Social Security Number (xxxx) and Date of Birth (mm/dd/yyyy) as indicated and click Submit. You will be taken to the next sign-up page. 5. Create a [email or web address] ID. This will be your [email or web address] login ID and cannot be changed, so think of one that is secure and easy to remember. 6. Create a [employer] password. You can change your password at any time. 7. Enter your Password Reset Question and Answer. This can be used at a later time if you forget your password. 8. Enter your e-mail address. You will receive e-mail notification when new information is available in [address] 9. Click Sign Up. You can now view and download portions of your medical record. 10. Click the Download Summary menu link to download a portable copy of your medical information. If you have questions, please visit the Frequently Asked Questions section of the [employer] website. Remember, [employer] is NOT to be used for urgent needs. For medical emergencies, dial 911. Now available from your iPhone and Android! General Information Please provide this summary of care documentation to your next provider. Patient Signature:  ____________________________________________________________ Date:  ____________________________________________________________  
  
[facility] Hidden Provider Signature:  ____________________________________________________________ Date:  ____________________________________________________________

## 2017-05-04 NOTE — ANESTHESIA POSTPROCEDURE EVALUATION
Post-Anesthesia Evaluation and Assessment    Patient: Matilde Hunt MRN: [id number]  SSN: [id number]    YOB: 1935  Age: 80 y.o. Sex: female       Cardiovascular Function/Vital Signs  Visit Vitals    BP 96/56    Pulse 60    Temp 36.7 °C (98.1 °F)    Resp 16    Ht 5' 2\" (1.575 m)    Wt 64 kg (141 lb)    SpO2 99%    Breastfeeding No    BMI 25.79 kg/m2       Patient is status post MAC anesthesia for Procedure(s):  UPPER ENDOSCOPY with biopsies. Nausea/Vomiting: None    Postoperative hydration reviewed and adequate. Pain:  Pain Scale 1: Numeric (0 - 10) (05/04/17 1157)  Pain Intensity 1: 0 (05/04/17 1157)   Managed    Neurological Status: At baseline    Mental Status and Level of Consciousness: Arousable    Pulmonary Status:   O2 Device: Room air (05/04/17 1251)   Adequate oxygenation and airway patent    Complications related to anesthesia: None    Post-anesthesia assessment completed.  No concerns    Signed By: Ivelisse Romeo MD     May 4, 2017

## 2017-05-04 NOTE — DISCHARGE INSTRUCTIONS
Upper GI Endoscopy: What to Expect at [address] [place] [address]  After you have an endoscopy, you will stay at the hospital or clinic for 1 to 2 hours. This will allow the medicine to wear off. You will be able to go home after your doctor or nurse checks to make sure you are not having any problems. You may have to stay overnight if you had treatment during the test. You may have a sore throat for a day or two after the test.  This care sheet gives you a general idea about what to expect after the test.  How can you care for yourself at home? Activity  · Rest as much as you need to after you go home. · You should be able to go back to your usual activities the day after the test.  Diet  · Follow your doctor's directions for eating after the test.  · Drink plenty of fluids (unless your doctor has told you not to). Medications  · If you have a sore throat the day after the test, use an over-the-counter spray to numb your throat. Follow-up care is a key part of your treatment and safety. Be sure to make and go to all appointments, and call your doctor if you are having problems. It's also a good idea to know your test results and keep a list of the medicines you take. When should you call for help? Call 911 anytime you think you may need emergency care. For example, call if:  · You passed out (lost consciousness). · You cough up blood. · You vomit blood or what looks like coffee grounds. · You pass maroon or very bloody stools. Call your doctor now or seek immediate medical care if:  · You have trouble swallowing. · You have belly pain. · Your stools are black and tarlike or have streaks of blood. · You are sick to your stomach or cannot keep fluids down. Watch closely for changes in your health, and be sure to contact your doctor if:  · Your throat still hurts after a day or two. · You do not get better as expected. Where can you learn more?    Go to [employer].be  Enter J454 in the search box to learn more about \"Upper GI Endoscopy: What to Expect at Home. \"   © 3993-5097 Healthwise, Incorporated. Care instructions adapted under license by Lalito Velasquez (which disclaims liability or warranty for this information). This care instruction is for use with your licensed healthcare professional. If you have questions about a medical condition or this instruction, always ask your healthcare professional. [address] any warranty or liability for your use of this information. Content Version: 98.0.263633; Current as of: November 14, 2014    DISCHARGE SUMMARY from Nurse     POST-PROCEDURE INSTRUCTIONS:    Call your Physician if you:  ? Observe any excess bleeding. ? Develop a temperature over 100.5o F.  ? Experience abdominal, shoulder or chest pain. ? Notice any signs of decreased circulation or nerve impairment to an extremity such as a change in color, persistent numbness, tingling, coldness or increase in pain. ? Vomit blood or you have nausea and vomiting lasting longer than 4 hours. ? Are unable to take medications. ? Are unable to urinate within 8 hours after discharge following general anesthesia or intravenous sedation. For the next 24 hours after receiving general anesthesia or intravenous sedation, or while taking prescription Narcotics, limit your activities:  ? Do NOT drive a motor vehicle, operate hazard machinery or power tools, or perform tasks that require coordination. The medication you received during your procedure may have some effect on your mental awareness. ? Do NOT make important personal or business decisions. The medication you received during your procedure may have some effect on your mental awareness. ? Do NOT drink alcoholic beverages. These drinks do not mix well with the medications that have been given to you. ? Upon discharge from the hospital, you must be accompanied by a responsible adult. ?  Resume your diet as directed by your physician. ? Resume medications as your physician has prescribed. ? Please give a list of your current medications to your Primary Care Provider. ? Please update this list whenever your medications are discontinued, doses are changed, or new medications (including over-the-counter products) are added. ? Please carry medication information at all times in case of emergency situations. These are general instructions for a healthy lifestyle:    No smoking/ No tobacco products/ Avoid exposure to second hand smoke.  Surgeon General's Warning:  Quitting smoking now greatly reduces serious risk to your health. Obesity, smoking, and a sedentary lifestyle greatly increase your risk for illness.  A healthy diet, regular physical exercise & weight monitoring are important for maintaining a healthy lifestyle   You may be retaining fluid if you have a history of heart failure or if you experience any of the following symptoms:  Weight gain of 3 pounds or more overnight or 5 pounds in a week, increased swelling in our hands or feet or shortness of breath while lying flat in bed. Please call your doctor as soon as you notice any of these symptoms; do not wait until your next office visit. Recognize signs and symptoms of STROKE:  F  -  Face looks uneven  A  -  Arms unable to move or move unevenly  S  -  Speech slurred or non-existent  T  -  Time to call 911 - as soon as signs and symptoms begin - DO NOT go back to bed or wait to see If you get better - TIME IS BRAIN. Colorectal Screening   Colorectal cancer almost always develops from precancerous polyps (abnormal growths) in the colon or rectum. Screening tests can find precancerous polyps, so that they can be removed before they turn into cancer.  Screening tests can also find colorectal cancer early, when treatment works best.  [facility] Speak with your physician about when you should begin screening and how often you should be tested  [facility]   Additional Information    If you have questions, please call [phone]. Remember, MyChart is NOT to be used for urgent needs. For medical emergencies, dial 911. Discharge information has been reviewed with the patient. The patient verbalized understanding.

## 2017-05-04 NOTE — ANESTHESIA PREPROCEDURE EVALUATION
Anesthetic History   No history of anesthetic complications       Comments:   Risk Factors for Postoperative nausea/vomiting:       History of postoperative nausea/vomiting? NO       Female? YES       Motion sickness? NO       Intended opioid administration for postoperative analgesia? NO      Smoking Abstinence  Current Smoker? NO  Elective Surgery? YES  Seen preoperatively by anesthesiologist or proxy prior to day of surgery? YES  Pt abstained from smoking 24 hours prior to anesthesia?  yes     Review of Systems / Medical History  Patient summary reviewed and pertinent labs reviewed    Pulmonary  Within defined limits                 Neuro/Psych   Within defined limits           Cardiovascular    Hypertension: poorly controlled          Hyperlipidemia         GI/Hepatic/Renal         Renal disease: CRI       Endo/Other      Hypothyroidism: well controlled  Arthritis, cancer and anemia     Other Findings              Physical Exam    Airway  Mallampati: II  TM Distance: 4 - 6 cm  Neck ROM: normal range of motion        Cardiovascular    Rhythm: regular  Rate: normal         Dental    Dentition: Caps/crowns     Pulmonary  Breath sounds clear to auscultation               Abdominal  GI exam deferred       Other Findings            Anesthetic Plan    ASA: 3            Induction: Intravenous  Anesthetic plan and risks discussed with: Patient

## 2017-05-04 NOTE — H&P
[facility]    [email or web address]      Impression:   1.severe esophagitis and barretts      Plan:     1. egd bx possible dil mac all risks discussed       Chief Complaint: my reflux       HPI:  Pedro Anne is a 80 y.o. female who is being seen on consult for reflux and barretts    PMH:   Past Medical History:   Diagnosis Date    [facility] ([facility]     denies    Hypertension     Ovarian cancer ([facility]     Thyroid dysfunction        PSH:   Past Surgical History:   Procedure Laterality Date    HX ENDOSCOPY  01/03/2017    Dr. Heather Bucio    [facility]  2011    Ovaries removed       Social HX:   Social History     Social History    Marital status:      Spouse name: N/A    Number of children: N/A    Years of education: N/A     Occupational History    Not on file. Social History Main Topics    Smoking status: Never Smoker    Smokeless tobacco: Never Used    Alcohol use Yes      Comment: Occasionally    Drug use: No    Sexual activity: Not on file     Other Topics Concern    Not on file     Social History Narrative       FHX:   Family History   Problem Relation Age of Onset    Cancer Other     Hypertension Other        Allergy:   Allergies   Allergen Reactions    Nexium [Esomeprazole Magnesium] Hives and Rash    Protonix [Pantoprazole] Hives and Rash       Home Medications:     Prescriptions Prior to Admission   Medication Sig    aspirin delayed-release 81 mg tablet Take 81 mg by mouth daily.  polyethylene glycol (MIRALAX) 17 gram/dose powder Take 17 g by mouth daily.  traMADol (ULTRAM) 50 mg tablet Take 50 mg by mouth every six (6) hours as needed for Pain.  raNITIdine hcl 150 mg capsule Take 300 mg by mouth two (2) times a day.  isradipine (DYNACIRC) 5 mg capsule Take 5 mg by mouth daily. Indications: Hypertension    cloNIDine HCl (CATAPRES) 0.1 mg tablet Take  by mouth two (2) times a day.     FOLIC ACID/MULTIVITS-MIN/LUT (CENTRUM SILVER PO) Take  by mouth.  garlic 2,904 mg cap Take 1 Cap by mouth daily.  KRILL OIL PO Take 350 mg by mouth daily.  solifenacin (VESICARE) 5 mg tablet Take 5 mg by mouth daily.  levothyroxine (SYNTHROID) 25 mcg tablet Take  by mouth Daily (before breakfast).  atenolol (TENORMIN) 50 mg tablet Take  by mouth daily.  loratadine 10 mg cap Take  by mouth.  Cholecalciferol, Vitamin D3, (VITAMIN D3) 1,000 unit cap Take  by mouth.  ibuprofen 200 mg cap Take  by mouth. Review of Systems:     Constitutional: No fevers, chills, weight loss, fatigue. Skin: No rashes, pruritis, jaundice, ulcerations, erythema. HENT: No headaches, nosebleeds, sinus pressure, rhinorrhea, sore throat. Eyes: No visual changes, blurred vision, eye pain, photophobia, jaundice. Cardiovascular: No chest pain, heart palpitations. Respiratory: No cough, SOB, wheezing, chest discomfort, orthopnea. Gastrointestinal: reflux   Genitourinary: No dysuria, bleeding, discharge, pyuria. Musculoskeletal: No weakness, arthralgias, wasting. Endo: No sweats. Heme: No bruising, easy bleeding. Allergies: As noted. Neurological: Cranial nerves intact. Alert and oriented. Gait not assessed. Psychiatric:  No anxiety, depression, hallucinations. Visit Vitals    /73    Pulse 69    Temp 98.1 °F (36.7 °C)    Ht 5' 2\" (1.575 m)    Wt 64 kg (141 lb)    SpO2 99%    Breastfeeding No    BMI 25.79 kg/m2       Physical Assessment:     constitutional: appearance: well developed, well nourished, normal habitus, no deformities, in no acute distress. skin: inspection: no rashes, ulcers, icterus or other lesions; no clubbing or telangiectasias. palpation: no induration or subcutaneos nodules. eyes: inspection: normal conjunctivae and lids; no jaundice pupils: symmetrical, normoreactive to light, normal accommodation and size.    ENMT: mouth: normal oral mucosa,lips and gums; good dentition. oropharynx: normal tongue, hard and soft palate; posterior pharynx without erythema, exudate or lesions. neck: no masses organomegaly or tenderness. respiratory: effort: normal chest excursion; no intercostal retraction or accessory muscle use. cardiovascular: abdominal aorta: normal size and position; no bruits. palpation: PMI of normal size and position; normal rhythm; no thrill or murmurs. abdominal: abdomen: normal consistency; no tenderness or masses. hernias: no hernias appreciated. liver: normal size and consistency. spleen: not palpable. rectal: hemoccult/guaiac: not performed. musculoskeletal: no deformities or muscle wasting   lymphatic: axilae: not palpable. groin: not palpable. neck: within normal limits. other: not palpable. neurologic: cranial nerves: II-XII normal.   psychiatric: judgement/insight: within normal limits. memory: within normal limits for recent and remote events. mood and affect: no evidence of depression, anxiety or agitation. orientation: oriented to time, space and person. Basic Metabolic Profile   No results for input(s): NA, K, CL, CO2, BUN, GLU, CA, MG, PHOS in the last 72 hours. No lab exists for component: CREAT      CBC w/Diff    No results for input(s): WBC, RBC, HGB, HCT, MCV, MCH, MCHC, RDW, PLT, HGBEXT, HCTEXT, PLTEXT in the last 72 hours. No lab exists for component: MPV No results for input(s): GRANS, LYMPH, EOS, PRO, MYELO, METAS, BLAST in the last 72 hours. No lab exists for component: MONO, BASO     Hepatic Function   No results for input(s): ALB, TP, TBILI, GPT, SGOT, AP, AML, LPSE in the last 72 hours. No lab exists for component: Jarrod Fernandez MD, M.D. [facility], [address] [place] [address]  [email or web address]

## 2017-05-10 NOTE — PROGRESS NOTES
PATIENT NAME: Leopoldo Charters         80 y.o.      1935              DATE:5/10/2017    REASON FOR VISIT: Shortness of breath    HISTORY OF PRESENT ILLNESS: 80-year-old woman with a one-month history of exertional shortness of breath. This is sometimes associated with exertional substernal tightness. It occurs when she climbs stairs. It does not occur at rest.  It is relieved by rest.  Symptoms have been mild according to her and are not progressing in frequency or severity. A stress echocardiogram was ordered but not performed because her echocardiography her apparently saw some wall motion abnormalities. I do not have access to this information. Patient denies a history of coronary artery disease. She is hypertensive and has a history of hyperlipidemia. There is no history of diabetes. Denies orthopnea and paroxysmal nocturnal dyspnea. Denies palpitation, syncope, presyncope. She does experience some mild edema that is worse towards the end of the day and better in the morning    PAST MEDICAL HISTORY:   Past Medical History:  No date: Breast cancer ([facility]      Comment: denies  No date: Hypertension  No date: Ovarian cancer ([facility]  No date: Thyroid dysfunction    PAST SURGICAL HISTORY:   Past Surgical History:  01/03/2017: HX ENDOSCOPY      Comment: Dr. Corey Browne  2011: HX OTHER SURGICAL      Comment: Ovaries removed      SOCIAL HISTORY:  Social History    Marital status:              Spouse name:                       Years of education:                 Number of children:               Social History Main Topics    Smoking status: Never Smoker                                                                Smokeless status: Never Used                        Alcohol use: Yes                Comment: Occasionally    Drug use:  No                ALLERGIES:    -- Nexium [Esomeprazole Magnesium] -- Hives and Rash   -- Protonix [Pantoprazole] -- Hives and Rash     CURRENT MEDICATIONS:   Current Outpatient Prescriptions:  aspirin delayed-release 81 mg tablet, Take 81 mg by mouth daily. garlic 0,675 mg cap, Take 1 Cap by mouth daily. polyethylene glycol (MIRALAX) 17 gram/dose powder, Take 17 g by mouth daily. traMADol (ULTRAM) 50 mg tablet, Take 50 mg by mouth every six (6) hours as needed for Pain. raNITIdine hcl 150 mg capsule, Take 300 mg by mouth two (2) times a day. (Patient taking differently: Take 150 mg by mouth two (2) times a day.)  isradipine (DYNACIRC) 5 mg capsule, Take 5 mg by mouth daily. Indications: Hypertension  levothyroxine (SYNTHROID) 25 mcg tablet, Take  by mouth Daily (before breakfast). cloNIDine HCl (CATAPRES) 0.1 mg tablet, Take  by mouth two (2) times a day. loratadine 10 mg cap, Take  by mouth. FOLIC ACID/MULTIVITS-MIN/LUT (CENTRUM SILVER PO), Take  by mouth. No current facility-administered medications for this visit. REVIEW of SYSTEMS:History obtained from chart review and the patient  General ROS: negative for - weight gain or weight loss  Hematological and Lymphatic ROS: negative for - bleeding problems  Respiratory ROS: Please see history of present illness. Denies cough and wheezing  Cardiovascular ROS: Please see history of present illness. Gastrointestinal ROS: no abdominal pain, change in bowel habits, or black or bloody stools  Neurological ROS: no TIA or stroke symptoms     PHYSICAL EXAMINATION:   /64  Pulse 81  Ht 5' 2\" (1.575 m)  Wt 65.8 kg (145 lb)  SpO2 96%  BMI 26.52 kg/m2  BP Readings from Last 3 Encounters:  05/10/17 : 130/64  05/04/17 : 133/58  01/03/17 : 154/68    Pulse Readings from Last 3 Encounters:  05/10/17 : 81  05/04/17 : 63  01/03/17 : 72    Wt Readings from Last 3 Encounters:  05/10/17 : 65.8 kg (145 lb)  05/04/17 : 64 kg (141 lb)  01/03/17 : 66.7 kg (147 lb)    General: Well-developed white female in no apparent distress. HEENT: Sclera clear. Mucous membranes pink and moist.  Neck: No jugular venous distention.   Carotid upstrokes 2+ without bruits. Chest: Clear to  auscultation. Heart: PMI not palpable. Regular rhythm. No murmur or gallop. Abdomen: Nontender without masses or organomegaly. Extremities: Trace edema. Dorsalis pedis and posterior tibial pulses 2+. Skin: Warm and dry. No stasis changes. Neuro: Alert, oriented, speech WNL, no facial asymmetry. Gait WNL. EKG: Anterior myocardial infarction age-indeterminate. Cannot rule out inferior myocardial infarction age-indeterminate. Abnormal T-wave inversions in the inferior and lateral precordial leads consistent with possible ischemia. The T-wave inversions were present on an EKG done in mid April of this year. The Q waves in the anterior leads are new. The prior EKG was also consistent with a prior inferior myocardial infarction. IMPRESSION:   Abnormal EKG consistent with anterior and inferior myocardial infarction. Exertional shortness of breath and chest tightness. Probable anginal equivalent versus congestive heart failure  Hypertension, controlled  Hyperlipidemia    PLAN:  Echocardiography  Exercise testing with Cardiolite  Return to office after the exercise test and echo    The diagnoses and plan were discussed with patient. All questions answered. Plan of care agreed to by all concerned. Katrina Mars.  MD Anthony       ,

## 2017-05-10 NOTE — MR AVS SNAPSHOT
Visit Information Date & Time Provider Department Dept. Phone Encounter #  
 5/10/2017  1:40 PM Dinah Michaud MD [facility] [address] [id number] Follow-up Instructions Follow-up and Disposition History Upcoming Health Maintenance Date Due DTaP/Tdap/Td series (1 - Tdap) 4/30/1956 ZOSTER VACCINE AGE 60> 4/30/1995 GLAUCOMA SCREENING Q2Y 4/30/2000 OSTEOPOROSIS SCREENING (DEXA) 4/30/2000 Pneumococcal 65+ High/Highest Risk (1 of 2 - PCV13) 4/30/2000 MEDICARE YEARLY EXAM 4/30/2000 INFLUENZA AGE 9 TO ADULT 8/1/2017 Allergies as of 5/10/2017  Review Complete On: 5/10/2017 By: Dinah iMchaud MD  
  
 Severity Noted Reaction Type Reactions Nexium [Esomeprazole Magnesium]  09/24/2014    Hives, Rash Protonix [Pantoprazole]  09/24/2014    Hives, Rash Current Immunizations  Never Reviewed No immunizations on file. Not reviewed this visit You Were Diagnosed With   
  
 Codes Comments Cardiomyopathy, unspecified type    -  Primary ICD-10-CM: I42.9 ICD-9-CM: 425.4 Abnormal EKG     ICD-10-CM: R94.31 
ICD-9-CM: 794.31 Coronary artery disease involving native heart, angina presence unspecified, unspecified vessel or lesion type     ICD-10-CM: I25.10 ICD-9-CM: 414.01 Essential hypertension     ICD-10-CM: I10 
ICD-9-CM: 401.9 Hyperlipidemia, unspecified hyperlipidemia type     ICD-10-CM: E78.5 ICD-9-CM: 272.4 Dyspnea on exertion     ICD-10-CM: R06.09 
ICD-9-CM: 786.09 Vitals BP Pulse Height(growth percentile) Weight(growth percentile) SpO2 BMI  
 130/64 81 5' 2\" (1.575 m) 145 lb (65.8 kg) 96% 26.52 kg/m2 OB Status Smoking Status Postmenopausal Never Smoker Vitals History BMI and BSA Data Body Mass Index Body Surface Area  
 26.52 kg/m 2 1.7 m 2 Your Updated Medication List  
  
   
 This list is accurate as of: 5/10/17  2:39 PM.  Always use your most recent med list.  
  
  
  
  
 aspirin delayed-release 81 mg tablet Take 81 mg by mouth daily. CENTRUM SILVER PO Take  by mouth.  
  
 cloNIDine HCl 0.1 mg tablet Commonly known as:  CATAPRES Take  by mouth two (2) times a day.  
  
 garlic 9,619 mg Cap Take 1 Cap by mouth daily. isradipine 5 mg capsule Commonly known as:  [facility] Take 5 mg by mouth daily. Indications: Hypertension  
  
 levothyroxine 25 mcg tablet Commonly known as:  SYNTHROID Take  by mouth Daily (before breakfast). loratadine 10 mg Cap Take  by mouth. MIRALAX 17 gram/dose powder Generic drug:  polyethylene glycol Take 17 g by mouth daily. raNITIdine hcl 150 mg capsule Take 300 mg by mouth two (2) times a day. traMADol 50 mg tablet Commonly known as:  ULTRAM  
Take 50 mg by mouth every six (6) hours as needed for Pain. We Performed the Following AMB POC EKG ROUTINE W/ 12 LEADS, INTER & REP [84731 CPT(R)] To-Do List   
 05/10/2017 ECHO:  2D ECHO COMPLETE ADULT (TTE) W OR WO CONTR   
  
 05/10/2017 ECG:  CARDIAC SPECT REST AND STRESS   
  
 05/10/2017 ECG:  NUCLEAR STRESS TEST   
  
 05/12/2017 8:15 AM  
  Appointment with Devex NUC CARD ROOM at [facility]-INVASIVE CARD ([phone]) This is a 2-part test which takes approximately 4 hours to complete. Please see part 2 of exam below for full instructions 05/12/2017 8:45 AM  
  Appointment with Devex NUC CARD ROOM at [facility]-INVASIVE CARD ([phone]) 1-No eating or coffee after midnight  2-Do not take diabetic meds (bring with) 3-Please take all other meds unless specified by cardiology 05/12/2017 9:00 AM  
  Appointment with Devex- IE33 MACHINE (WT ) at [facility] NON-INVASIVE CARD ([phone]) Age Limit for ALL Heart procedures @ [facility] facilities: 18 yrs and older only. Under the age of 25, refer to [facility] ([phone]). Wt Limit: 350lbs. This study requires patient to bring a written physician's order or MD office may fax the order to Central Scheduling at [phone]. Patient needs to bring a current list of all medications. No preparation is required for this study. Patients should report 15 minutes prior to their appointment time to the [facility], [address] [place] [address]/[address]. Introducing [facility]! Kizzy Dahlia introduces [employer] patient portal. Now you can access parts of your medical record, email your doctor's office, and request medication refills online. 1. In your internet browser, go to [email or web address] 2. Click on the First Time User? Click Here link in the Sign In box. You will see the New Member Sign Up page. 3. Enter your [employer] Access Code exactly as it appears below. You will not need to use this code after youve completed the sign-up process. If you do not sign up before the expiration date, you must request a new code. · [employer] Access Code: [email or web address] Expires: 7/19/2017  1:34 PM 
 
4. Enter the last four digits of your Social Security Number (xxxx) and Date of Birth (mm/dd/yyyy) as indicated and click Submit. You will be taken to the next sign-up page. 5. Create a [employer] ID. This will be your [employer] login ID and cannot be changed, so think of one that is secure and easy to remember. 6. Create a [employer] password. You can change your password at any time. 7. Enter your Password Reset Question and Answer. This can be used at a later time if you forget your password. 8. Enter your e-mail address. You will receive e-mail notification when new information is available in [address] 9. Click Sign Up. You can now view and download portions of your medical record. 10. Click the Download Summary menu link to download a portable copy of your medical information. If you have questions, please visit the Frequently Asked Questions section of the [employer] website. Remember, [employer] is NOT to be used for urgent needs. For medical emergencies, dial 911. Now available from your iPhone and Android! Please provide this summary of care documentation to your next provider. Your primary care clinician is listed as Yoandy Quan. If you have any questions after today's visit, please call [phone].

## 2017-05-10 NOTE — PROGRESS NOTES
1. Have you been to the ER, urgent care clinic since your last visit? Hospitalized since your last visit? No     2. Have you seen or consulted any other health care providers outside of the [address] [place] [address] since your last visit? Include any pap smears or colon screening.  No

## 2017-05-12 NOTE — TELEPHONE ENCOUNTER
Patient is scheduled for Cath on 5/17/17 @ 10:30am with Dr. Kaci Sandoval. Jennifer Cano or Juan Perez could you please call her with instructions.            Message  Received:  Today       Ezra Ford                     Per Dr. Bela Dasilva, please schedule patient for cath dx: abn nuc

## 2017-05-12 NOTE — PROCEDURES
[address] STRESS    Name:  Nettie Romero  MR#:  [id number]  :  1935  Account #:  [de-identified]  Date of Adm:  2017  Date of Service:  2017      PROCEDURE: Pharmacological nuclear stress test with gated SPECT  imaging. ORDERING PHYSICIAN: Tamara Stock MD    INDICATIONS: Abnormal EKG, cardiomyopathy. Resting heart rate 72, blood pressure 160/84. Baseline EKG shows sinus rhythm, inferolateral Q-wave and T-wave  inversions consistent with prior myocardial infarction, poor R-wave  progression, cannot exclude prior anterior wall infarct. Diffuse T-wave  abnormalities. PHARMACOLOGICAL STRESS PORTION: The patient had a  Lexiscan infusion 0.4 mg intravenously per protocol via the right arm IV  and then did a low level 3 minute walk. The patient remained in sinus  rhythm with baseline T-wave changes making this a non-diagnostic  portion of the test. She did develop rare isolated PVCs as well. PERFUSION IMAGING: The patient received intravenous technetium-  99m sestamibi 10.5 mCi intravenously per protocol via the right arm IV  for the resting images and then 33.0 mCi an hour and a half later via  the same IV for stress images. Tomographic views of the left ventricle  obtained and compared. Cavity size was normal with both rest and  stress imaging. There is no transient ischemic dilatation present. There  was a large, dense, primarily fixed perfusion imaging abnormality  involving the entire inferior wall, inferoseptum and inferolateral wall,  consistent with a large prior myocardial infarction. There was a  medium-sized, moderate, partially reversible perfusion imaging  abnormality involving the left ventricular apex and distal anterior wall  concerning for a prior area of infarct with possible ischemia.  On gated  analysis, left ventricular end-diastolic volume was calculated to be  normal, systolic function was moderately depressed with ejection  fraction calculated at 39% with akinesis of the inferior wall and  inferolateral wall and hypokinesis of the left ventricular apex and distal  anterior wall. Ejection fraction calculated at 38%. CONCLUSIONS  1. Abnormal and high risk pharmacological nuclear stress test.  2. Large area of dense infarction involving the entire inferior wall,  inferolateral wall and inferoseptum. 3. Medium size area of ischemia/infarct involving the distal anterior wall  and apex. 4. Moderately depressed left ventricular systolic function, EF 83%.         MD PAULETTE Perdomo / Kashif Smith  D:  05/12/2017   12:41  T:  05/12/2017   13:13  Job #:  [id number]

## 2017-05-12 NOTE — PROGRESS NOTES
Patient was injected with 94.4 millicuries 93XWY Sestamibi on 5/12/17 at 0815. Patient was injected with 13.4 millicuries 11NDE Sestamibi on  5/12/17 at 1015. Patient's armbands were removed and placed in shred-it box.     Patient had a Nuclear Lexiscan Stress Test.

## 2017-05-15 NOTE — TELEPHONE ENCOUNTER
Patient given instructions with time allowed for all questions to be answered. She verbalized understanding.  Patient states she will have labs and cxr done today at [facility]

## 2017-05-15 NOTE — TELEPHONE ENCOUNTER
Instructions    Patients Name:  Bela Minaya    1. You are scheduled to have a cardiac catheterization on May 17, 2017  at 1030 Please check in at 0930  .     2. Please go to [facility] and park in the outpatient parking lot that is located around to the back of the hospital and enter through the [employer]. Once you enter through the [facility] check in with the  there. The  will either give you directions or assist you in getting to the cath holding area. 3. You are not to eat anything after midnight the morning of the procedure. Please continue to drink fluids up until 0900. (water, juice, black coffee, soft drinks). Do not drink milk or milk products or anything with cream.    4. If you are diabetic, do not take your insulin/sugar pill the morning of the procedure. 5. MEDICATION INSTRUCTIONS:   Please take your morning medications with the following special instructions:    [x]          Please make sure to take your Blood pressure medication    [x]          Take your Aspirin       6. We encourage families to wait in the waiting room on the first floor while the procedure is being done. The Doctor will come out and talk with you as soon as the procedure is over. 7. There is the possibility that you may spend the night in the hospital, depending on the results of the procedure. This will be determined after the procedure is done. If angioplasty or stent is planned, you will stay at least one day. 8. If you or your family have any questions, please call our office Monday -Friday, 9:00 a.m.-4:30 p.m.,  At [phone], and ask to speak to one of the nurses.

## 2017-05-17 PROBLEM — I20.0 UNSTABLE ANGINA (HCC): Status: ACTIVE | Noted: 2017-01-01

## 2017-05-17 NOTE — PROGRESS NOTES
TRANSFER - IN REPORT:    Verbal report received from TANYA Bradshaw(name) on [facility]  being received from cath lab(unit) for routine post - op      Report consisted of patients Situation, Background, Assessment and   Recommendations(SBAR). Information from the following report(s) SBAR, OR Summary, Procedure Summary, Intake/Output, MAR, Recent Results and Cardiac Rhythm nsr with t wave depression was reviewed with the receiving nurse. Opportunity for questions and clarification was provided. Assessment completed upon patients arrival to unit and care assumed.      Right groin dressing C/D/I, noted small area of hardening on palpation, applied direct manual pressure for several minutes, resolved issue

## 2017-05-17 NOTE — ROUTINE PROCESS
TRANSFER - IN REPORT:    Verbal report received from [facility], RN. (name) on Leopoldo Anitra  being received from [facility] for ordered procedure      Report consisted of patients Situation, Background, Assessment and   Recommendations(SBAR). Information from the following report(s) SBAR and MAR was reviewed with the receiving nurse. Opportunity for questions and clarification was provided. Assessment completed upon patients arrival to unit and care assumed.

## 2017-05-17 NOTE — PROGRESS NOTES
Per last office note:    IMPRESSION:   Abnormal EKG consistent with anterior and inferior myocardial infarction. Exertional shortness of breath and chest tightness.  Probable anginal equivalent versus congestive heart failure  Hypertension, controlled  Hyperlipidemia     PLAN:  Echocardiography  Exercise testing with Cardiolite  Return to office after the exercise test and echo

## 2017-05-17 NOTE — ROUTINE PROCESS
TRANSFER - OUT REPORT:    Verbal report given to Ravinder Alonso RN(name) on Aga Almaguer  being transferred to [facility](unit) for routine post - op       Report consisted of patients Situation, Background, Assessment and   Recommendations(SBAR). Information from the following report(s) Procedure Summary was reviewed with the receiving nurse. Lines:       Opportunity for questions and clarification was provided.       Patient transported with:   Registered Nurse

## 2017-05-17 NOTE — PROGRESS NOTES
0945: Pt brought to [facility] 108 ambulatory. Pt placed in bay 1 and connected to monitor. Baseline VS taken and PIV started x 2. Admission database completed. Pt ready for ordered procedure. 1105: Pt taken to cath lab for ordered procedure. Report given to [place]. 1404: Pt returned to [facility] from cath lab. Small hematoma noted to puncture site. Manual pressure held by Aldo Beasley RN. Hematoma resolved without complication    1213: Pt resting on stretcher in NAD. Pt wishes to be elevated in bed. HOB elevated 30 degrees. Groin remains CDI. 1635: Pt complaining of \"indigestion\" type pain that is different in nature compared to her normal GERD symptoms. Noticeable ECG changes on monitor. Nitro gtt titrated to 20mcg/min and EKG performed and Dr. Allie Quesada notified and made aware of EKG changes. Orders received for IV fentanyl, versed, and PO metoprolol. Orders carried out. 1700: Pt states she is currently chest pain free and feels \"much better\" Nitro gtt titrated to 15mcg/min. Family remains at bedside    1730: Pt remains chest pain free. Nitro gtt titrated back to 10mcg/min. ECG on monitor noted to be back to baseline. Pt continues to state she feels better. Will continue to monitor.

## 2017-05-17 NOTE — IP AVS SNAPSHOT
Laurynlelia Rajesh 
 
 
 [address] [address] 
[phone] Patient: Parish Borges MRN: [id number] ZIZ:[id number] You are allergic to the following Allergen Reactions Nexium (Esomeprazole Magnesium) Hives Rash Protonix (Pantoprazole) Hives Rash Recent Documentation Height Weight Breastfeeding? BMI OB Status Smoking Status 1.575 m 69.9 kg No 28.2 kg/m2 Postmenopausal Never Smoker Emergency Contacts Name Discharge Info Relation Home Work Mobile Julian Berry DISCHARGE CAREGIVER [3] Spouse [3] [phone] ARPITJulian  Spouse [3] [phone] About your hospitalization You were admitted on:  May 17, 2017 You last received care in the:  [facility] 2 CV STEPDOWN You were discharged on:  May 19, 2017 Unit phone number:  [phone] Why you were hospitalized Your primary diagnosis was:  Not on File Your diagnoses also included:  Unstable Angina (Hcc), Cad (Coronary Artery Disease), Ischemic Cardiomyopathy Providers Seen During Your Hospitalizations Provider Role Specialty Primary office phone Bib Coffey MD Attending Provider Cardiology [phone] Your Primary Care Physician (PCP) Primary Care Physician Office Phone Office Fax Niranjan Syd, [address] [place] [address] [phone] Follow-up Information Follow up With Details Comments Contact Info Tacey Soulier, MD On 5/26/2017 Appointment Time @9:00 [facility] 
[phone] Bib Coffey MD On 6/6/2017 Appointment Time @10:00am [address] [place] [address] [address] [facility] 
[phone] Your Appointments Tuesday June 06, 2017 10:00 AM EDT  
POST HOSPITAL with Bib Coffey MD  
[facility] ([address]) Shandra Corey Deandre [id number] [phone] Current Discharge Medication List  
  
START taking these medications Dose & Instructions Dispensing Information Comments Morning Noon Evening Bedtime  
 clopidogrel 75 mg Tab Commonly known as:  PLAVIX Your last dose was: Your next dose is:    
   
   
 Dose:  75 mg Take 1 Tab by mouth daily. Quantity:  30 Tab Refills:  11  
     
   
   
   
  
 metoprolol tartrate 50 mg tablet Commonly known as:  LOPRESSOR Your last dose was: Your next dose is:    
   
   
 Dose:  50 mg Take 1 Tab by mouth every twelve (12) hours. Quantity:  60 Tab Refills:  11  
     
   
   
   
  
 nitroglycerin 0.4 mg SL tablet Commonly known as:  NITROSTAT Your last dose was: Your next dose is:    
   
   
 Dose:  0.4 mg  
1 Tab by SubLINGual route every five (5) minutes as needed for Chest Pain. Quantity:  1 Bottle Refills:  2  
     
   
   
   
  
 simvastatin 20 mg tablet Commonly known as:  ZOCOR Your last dose was: Your next dose is:    
   
   
 Dose:  20 mg Take 1 Tab by mouth nightly. Quantity:  30 Tab Refills:  11 CONTINUE these medications which have CHANGED Dose & Instructions Dispensing Information Comments Morning Noon Evening Bedtime  
 raNITIdine hcl 150 mg capsule What changed:  how much to take Your last dose was: Your next dose is:    
   
   
 Dose:  300 mg Take 300 mg by mouth two (2) times a day. Quantity:  60 Cap Refills:  12 CONTINUE these medications which have NOT CHANGED Dose & Instructions Dispensing Information Comments Morning Noon Evening Bedtime  
 aspirin delayed-release 81 mg tablet Your last dose was: Your next dose is:    
   
   
 Dose:  81 mg Take 81 mg by mouth daily. Refills:  0 CENTRUM SILVER PO Your last dose was: Your next dose is: Take  by mouth. Refills:  0  
     
   
   
   
  
 garlic 5,758 mg Cap Your last dose was: Your next dose is:    
   
   
 Dose:  1 Cap Take 1 Cap by mouth daily. Refills:  0  
     
   
   
   
  
 isradipine 5 mg capsule Commonly known as:  [facility] CUSHING Your last dose was: Your next dose is:    
   
   
 Dose:  5 mg Take 5 mg by mouth daily. Indications: Hypertension Refills:  0  
     
   
   
   
  
 levothyroxine 25 mcg tablet Commonly known as:  SYNTHROID Your last dose was: Your next dose is: Take  by mouth Daily (before breakfast). Refills:  0  
     
   
   
   
  
 loratadine 10 mg Cap Your last dose was: Your next dose is: Take  by mouth. Refills:  0 MIRALAX 17 gram/dose powder Generic drug:  polyethylene glycol Your last dose was: Your next dose is:    
   
   
 Dose:  17 g Take 17 g by mouth daily. Refills:  0  
     
   
   
   
  
 traMADol 50 mg tablet Commonly known as:  ULTRAM  
   
Your last dose was: Your next dose is:    
   
   
 Dose:  50 mg Take 50 mg by mouth every six (6) hours as needed for Pain. Refills:  0 STOP taking these medications   
 cloNIDine HCl 0.1 mg tablet Commonly known as:  CATAPRES Where to Get Your Medications Information on where to get these meds will be given to you by the nurse or doctor. ! Ask your nurse or doctor about these medications  
  clopidogrel 75 mg Tab  
 metoprolol tartrate 50 mg tablet  
 nitroglycerin 0.4 mg SL tablet  
 simvastatin 20 mg tablet Discharge Instructions Patient armband removed and shredded DISCHARGE SUMMARY from Nurse The following personal items are in your possession at time of discharge: 
 
Dental Appliances: None Visual Aid: None Home Medications: Sent home Clothing: At bedside PATIENT INSTRUCTIONS: 
 
 
F-face looks uneven A-arms unable to move or move unevenly S-speech slurred or non-existent T-time-call 911 as soon as signs and symptoms begin-DO NOT go Back to bed or wait to see if you get better-TIME IS BRAIN. Warning Signs of HEART ATTACK Call 911 if you have these symptoms: 
? Chest discomfort. Most heart attacks involve discomfort in the center of the chest that lasts more than a few minutes, or that goes away and comes back. It can feel like uncomfortable pressure, squeezing, fullness, or pain. ? Discomfort in other areas of the upper body. Symptoms can include pain or discomfort in one or both arms, the back, neck, jaw, or stomach. ? Shortness of breath with or without chest discomfort. ? Other signs may include breaking out in a cold sweat, nausea, or lightheadedness. Don't wait more than five minutes to call [address]! Fast action can save your life. Calling 911 is almost always the fastest way to get lifesaving treatment. Emergency Medical Services staff can begin treatment when they arrive  up to an hour sooner than if someone gets to the hospital by car. The discharge information has been reviewed with the patient. The patient verbalized understanding. Discharge medications reviewed with the patient and appropriate educational materials and side effects teaching were provided. Discharge Orders None [facility] Announcement We are excited to announce that we are making your provider's discharge notes available to you in [email or web address].   You will see these notes when they are completed and signed by the physician that discharged you from your recent hospital stay. If you have any questions or concerns about any information you see in [employer], please call the Health Information Department where you were seen or reach out to your Primary Care Provider for more information about your plan of care. Introducing [facility]! Paolageetha Albrecht introduces [employer] patient portal. Now you can access parts of your medical record, email your doctor's office, and request medication refills online. 1. In your internet browser, go to [email or web address] 2. Click on the First Time User? Click Here link in the Sign In box. You will see the New Member Sign Up page. 3. Enter your Imm[employer] Access Code exactly as it appears below. You will not need to use this code after youve completed the sign-up process. If you do not sign up before the expiration date, you must request a new code. · [employer] Access Code: [email or web address] Expires: 7/19/2017  1:34 PM 
 
4. Enter the last four digits of your Social Security Number (xxxx) and Date of Birth (mm/dd/yyyy) as indicated and click Submit. You will be taken to the next sign-up page. 5. Create a Immusoft ID. This will be your [employer] login ID and cannot be changed, so think of one that is secure and easy to remember. 6. Create a Imm[employer] password. You can change your password at any time. 7. Enter your Password Reset Question and Answer. This can be used at a later time if you forget your password. 8. Enter your e-mail address. You will receive e-mail notification when new information is available in [address] 9. Click Sign Up. You can now view and download portions of your medical record. 10. Click the Download Summary menu link to download a portable copy of your medical information. If you have questions, please visit the Frequently Asked Questions section of the [employer] website. Remember, [employer] is NOT to be used for urgent needs. For medical emergencies, dial 911. Now available from your iPhone and Android! General Information Please provide this summary of care documentation to your next provider. Patient Signature:  ____________________________________________________________ Date:  ____________________________________________________________  
  
[place] Provider Signature:  ____________________________________________________________ Date:  ____________________________________________________________

## 2017-05-17 NOTE — PROCEDURES
CARDIAC CATHETERIZATION LABORATORY PROCEDURE REPORT    Sivan Juarez is a 80 y.o. female    Medical Record Number: [id number]    Primary Care Provider: Julianne Berrios MD      Referral Provider: Clarisa Banegas MD    Procedure Date: 5/17/2017    INDICATIONS: Unstable angina with abnormal nuclear scan, high risk. MD PERFORMING PROCEDURE: Jose E Beltran MD    PROCEDURE:  Left heart catheterization, coronary angiography, left ventriculography and coronary artery stenting. ANESTHESIA: Moderate sedation and local anesthesia. EBL: 30-50 ml  Contrast: 365 ml  Radiation exposure: 4788 mGy    Risks, benefits, and alternatives were explained to the patient and appropriate informed consent was obtained prior to the procedure. The patient was brought to the cath lab in a post-absorptive state and she was prepped and draped in the usual sterile manner. Moderate sedation was achieved with the combination of Versed (midazolam) and Fentanyl. Lidocaine was used to secure local anesthesia. The right femoral artery was cannulated using a modified Seldinger technique and a 6F [place] sheath was inserted. Six [place] and JR4 catheters were used to obtain selective bilateral coronary angiograms in multiple projections. LV angiography was performed in the [place] projection using an angled 6F [place] pigtail catheter and a hand injection. Pressures were recorded in the LV and during pull back across the aortic valve. The following were observed. FLUOROSCOPY: There was severe significant calcification. HEMODYNAMIC / ANGIOGRAPHIC DATA  · Left ventricle: End diastolic pressure was 7 mmHg. Left ventriculography: The EF was estimated to be around 40 %. There was inferobasal akinetic diagnostic segmental wall motion abnormality. · Aorta: There was no significant systolic pressure gradient across the aortic valve. · Mitral valve: There was no significant mitral regurgitation.   · Coronary Angiography:  · The coronary circulation was right dominant. · Left main: Angiographically normal.  · Left anterior descending: Heavily calcified, tortuous, proximal and mid serial 90% to 95%. · Diagonal Branches: Angiographically normal.  · Circumflex: Mid 30-40%. · Obtuse Marginal Branches: Angiographically normal.  · Right coronary: Mid 100%  with distal segments collateralized from left. INTERVENTION:  A weight adjusted loading dose of IV Angiomax was given and infusion of the drug was started . Using a 6 [place] XB 3.5 guiding catheter, the lesion in the LAD coronary artery was crossed with a 0.014\" Cross-it 100 guide wire. Predilatation was performed with 2.5 mm PTCA balloon ( 15 mm length). Stenting was not performed due to unclear whether the wire was in the true lumen. The distal segment was collateralized from circumflex. Therefore, patient remained hemodynamically stable. After the balloon dilatation, decision was made to continue medical therapy. If she has recurrent chest pains, she can be brought back for repeat angioplasty attempt. Her distal target for the LAD is quite small and diffusely diseased. I do not feel that she is a good candidate for coronary artery bypass graft surgery for single-vessel bypass. The distal RCA is collateralized but it appears to be chronic. The inferobasal segment of the left ventricle is akinetic consistent with transmural myocardial infarction. Intracoronary NTG was used during the procedure. Excellent angiographic results were observed and VERNA 3 flow was noted after the PCI. After an appropriate waiting phase final coronary angiograms were obtained and the catheter was withdrawn. Oral antiplatelet therapy was administered in the cardiac cath lab in the form of aspirin 325 mg and 300 mg Plavix. The procedure was well tolerated and there was no apparent immediate complication. Hemostasis was performed using Angioseal device.  The patient was transferred to the observation area with stable vital signs and in an asymptomatic state. SUMMARY: Balloon angioplasty of subtotal, proximal mid LAD 90-95% stenosis. No apparent immediate complication. Moderate sedation was utilized for 90 minutes using Versed and fentanyl under my supervision. RECOMMENDATIONS:  Post-procedure care will focus on aggressive risk factor modification. Benefits of tobacco abstinence discussed.     Electronically signed Nathen Tavera MD

## 2017-05-17 NOTE — H&P
PATIENT NAME: Caitlin Smith 80 y.o. 1935 DATE:5/10/2017     REASON FOR VISIT: Shortness of breath     HISTORY OF PRESENT ILLNESS: 80-year-old woman with a one-month history of exertional shortness of breath. This is sometimes associated with exertional substernal tightness. It occurs when she climbs stairs. It does not occur at rest. It is relieved by rest. Symptoms have been mild according to her and are not progressing in frequency or severity.     A stress echocardiogram was ordered but not performed because her echocardiography her apparently saw some wall motion abnormalities. I do not have access to this information.     Patient denies a history of coronary artery disease. She is hypertensive and has a history of hyperlipidemia. There is no history of diabetes.     Denies orthopnea and paroxysmal nocturnal dyspnea. Denies palpitation, syncope, presyncope. She does experience some mild edema that is worse towards the end of the day and better in the morning     PAST MEDICAL HISTORY:   Past Medical History:  No date[facility]  Comment: denies  No date: Hypertension  No date: Ovarian cancer ([facility]  No date: Thyroid dysfunction     PAST SURGICAL HISTORY:   Past Surgical History:  01/03/2017: HX ENDOSCOPY  Comment: Dr. Amaya Borjas  2011: HX OTHER SURGICAL  Comment: Ovaries removed      SOCIAL HISTORY:  Social History  Marital status:  Spouse name:   Years of education: Number of children:      Social History Main Topics  Smoking status: Never Smoker      Smokeless status: Never Used   Alcohol use: Yes   Comment: Occasionally  Drug use: No         ALLERGIES:   -- Nexium [Esomeprazole Magnesium] -- Hives and Rash  -- Protonix [Pantoprazole] -- Hives and Rash      CURRENT MEDICATIONS:   Current Outpatient Prescriptions:  aspirin delayed-release 81 mg tablet, Take 81 mg by mouth daily. garlic 0,714 mg cap, Take 1 Cap by mouth daily.   polyethylene glycol (MIRALAX) 17 gram/dose powder, Take 17 g by mouth daily. traMADol (ULTRAM) 50 mg tablet, Take 50 mg by mouth every six (6) hours as needed for Pain. raNITIdine hcl 150 mg capsule, Take 300 mg by mouth two (2) times a day. (Patient taking differently: Take 150 mg by mouth two (2) times a day.)  isradipine (DYNACIRC) 5 mg capsule, Take 5 mg by mouth daily. Indications: Hypertension  levothyroxine (SYNTHROID) 25 mcg tablet, Take by mouth Daily (before breakfast). cloNIDine HCl (CATAPRES) 0.1 mg tablet, Take by mouth two (2) times a day. loratadine 10 mg cap, Take by mouth. FOLIC ACID/MULTIVITS-MIN/LUT (CENTRUM SILVER PO), Take by mouth.     No current facility-administered medications for this visit.         REVIEW of SYSTEMS:History obtained from chart review and the patient  General ROS: negative for - weight gain or weight loss  Hematological and Lymphatic ROS: negative for - bleeding problems  Respiratory ROS: Please see history of present illness. Denies cough and wheezing  Cardiovascular ROS: Please see history of present illness. Gastrointestinal ROS: no abdominal pain, change in bowel habits, or black or bloody stools  Neurological ROS: no TIA or stroke symptoms     PHYSICAL EXAMINATION:   /64  Pulse 81  Ht 5' 2\" (1.575 m)  Wt 65.8 kg (145 lb)  SpO2 96%  BMI 26.52 kg/m2  BP Readings from Last 3 Encounters:  05/10/17 : 130/64  05/04/17 : 133/58  01/03/17 : 154/68     Pulse Readings from Last 3 Encounters:  05/10/17 : 81  05/04/17 : 63  01/03/17 : 72     Wt Readings from Last 3 Encounters:  05/10/17 : 65.8 kg (145 lb)  05/04/17 : 64 kg (141 lb)  01/03/17 : 66.7 kg (147 lb)     General: Well-developed white female in no apparent distress. HEENT: Sclera clear. Mucous membranes pink and moist. Neck: No jugular venous distention. Carotid upstrokes 2+ without bruits. Chest: Clear to auscultation. Heart: PMI not palpable. Regular rhythm. No murmur or gallop. Abdomen: Nontender without masses or organomegaly. Extremities: Trace edema.  Dorsalis pedis and posterior tibial pulses 2+. Skin: Warm and dry. No stasis changes. Neuro: Alert, oriented, speech WNL, no facial asymmetry. Gait WNL.    EKG: Anterior myocardial infarction age-indeterminate. Cannot rule out inferior myocardial infarction age-indeterminate. Abnormal T-wave inversions in the inferior and lateral precordial leads consistent with possible ischemia. The T-wave inversions were present on an EKG done in mid April of this year. The Q waves in the anterior leads are new. The prior EKG was also consistent with a prior inferior myocardial infarction.     IMPRESSION:   Abnormal EKG consistent with anterior and inferior myocardial infarction. Exertional shortness of breath and chest tightness. Probable anginal equivalent versus congestive heart failure  Hypertension, controlled  Hyperlipidemia     PLAN:  Echocardiography  Exercise testing with Cardiolite  Return to office after the exercise test and echo     The diagnoses and plan were discussed with patient. All questions answered. Plan of care agreed to by all concerned.  Blaise Schaefer. MD [place]        Addendum:    Nuclear scan on 5/12/17 CONCLUSIONS  1. Abnormal and high risk pharmacological nuclear stress test.  2. Large area of dense infarction involving the entire inferior wall, inferolateral wall and inferoseptum. 3. Medium size area of ischemia/infarct involving the distal anterior wall and apex. 4. Moderately depressed left ventricular systolic function, EF 61%.     J  5/17/17    Addendum: She has no CHF. She does have LV dysfunction and cardiomyopathy, ischemic.

## 2017-05-18 NOTE — ROUTINE PROCESS
Bedside turnover given to me by SUSAN VARGAS. Pt is on the cardiac telemetry  Monitor no c/o pain, does not feel short of breath,  She is on a nasal cannula, does not wear O2 at home, it is for comfort post surgical procedure. Heparin and Nitro verified. Pt's daughter is at bedside and staying the night with her. Pt informed she is flat in the bed and on bedrest until after 8 pm.    Paged Dr. Wilma Angeles, pt c/o pain in epigastric area was given order to allow pt to take her own gaviscon from home. 2000 pt and daughter eating sandwiches, I removed the ayala catheter and pt used the restroom, she had a lot of gas and burping. 2245 Iv kept occluding, it was working and no pain to the patient however every time she moved in bed it began to beep, a new IV was placed in her hand. Bedside turnover given to SUSAN West_ pt on cardiac telemetry monitor, denies chest pain and denies shortness of breath.   SBAR, MAR, ED Summary given and a chance to ask questions.//

## 2017-05-18 NOTE — PROGRESS NOTES
0730 Received report from off going RN. Patient alert and oriented x3. Family present. NTG drip infusing at 10mcg/min and Heparin drip infusing per order. 1305 BP 88/55, HR 62. Nitroglycerin discontinued as previously ordered. Patient asymptomatic. Drinking liquids. Family present. [address] to patient room due to patient stating that she feels lightheaded. BP 98/60, HR 64. NSR on telemetry. Noted previous alarm on monitor of 5 beats v.tach 10 minutes earlier. Family present.    1406. Patient with complains of being lightheaded and complaining of discomfort to chest but not saying chest pain. Oxygen placed at 2L n/c.EKG done. Sury Nieto 76 paged and to floor. Made aware of the above. EKG and vital signs. In to see patient. 1630 Patient denies chest pain or shortness of breath. Ambulated with family. Denies needs. 1930 Bedside and Verbal shift change report given to Sury Nieto 44 (oncoming nurse) by Cayetano Valentin (offgoing nurse). Report included the following information SBAR, Kardex and MAR.

## 2017-05-18 NOTE — CARDIO/PULMONARY
Cardiac rehab in to see patient this am. She was lying in bed. She stated that she felt good this am. We talked about relaxation, exercise and nutrition. She stated that she jossue's for relaxation. She stated that some days- that is all that she does. Encouraged her to continue to do that some. We then talked about exercise. She stated that she does most of her own housework for a 2 story house. She has help with heavier things. Encouraged patient to continue to do her housework but to also add some exercise such as walking and doing some activity in the pool, which would be easier on her back. She stated that she will attempt to incorporate more continuous activity into her weekly routines. She appreciated the information that was given and shared. Will continue to follow.

## 2017-05-18 NOTE — CDMP QUERY
\"...versus congestive heart failure\" is documented on H&P. Please clarify this diagnosis. =>Chronic Systolic CHF  or  =>CHF ruled out  =>Other Explanation of clinical findings  =>Unable to Determine (no explanation of clinical findings)    The medical record reflects the following:  Risk:  --admitted with CAD with unstable angina  --PMH HTN    Clinical Indicators:  --5/18 progress notes: EF 40% with inferobasal akinetic diagnostic segmental wall motion abnormality by LV gram    Treatment:   --receiving Lopressor PO, Dynacirc PO, Catapres PO  --receiving NTG gtt IV    Please clarify and document your clinical opinion in the progress notes and discharge summary including the definitive and/or presumptive diagnosis, (suspected or probable), related to the above clinical findings. Please include clinical findings supporting your diagnosis. If you DECLINE this query or would like to communicate with [facility], please utilize the \"[employer] message box\" at the TOP of the Progress Note on the right.       Thank you,  Machelle Bess [address] [place] [address]

## 2017-05-18 NOTE — PROGRESS NOTES
met with patient in Cardiac Stepdown Unit, completed the initial Spiritual Assessment of the patient, and offered Pastoral Care, see flow sheets for interventions. Patient said she is [address]. Her graeme is important to her well being. She has children and a  and indicated they are supportive. Pastoral support provided. Patient does not have any [employer]ian/cultural needs that will affect patients preferences in health care. Chart reviewed. Chaplains will continue to follow and will provide pastoral care on an as needed/as requested basis. Abad Wadsworth MDiv.   Board Certified [employer] [phone]

## 2017-05-18 NOTE — CDMP QUERY
\"ICMY\" documented on 5/18 pn. Please spell out. The medical record reflects the following:  Risk:  --admitted with unstable angina with CAD    Clinical Indicators:  --5/18 pn: Royce Madden with EF 40% with inferobasal akinetic diagnostic segmental wall motion abnormality by LV gram.    Treatment:   --had PTCA  --receiving Lopressor PO, Dynacirc PO, Catapres PO  --receiving NTG gtt IV    Please clarify and document your clinical opinion in the progress notes and discharge summary including the definitive and/or presumptive diagnosis, (suspected or probable), related to the above clinical findings. Please include clinical findings supporting your diagnosis. If you DECLINE this query or would like to communicate with [employer], please utilize the \"[employer] message box\" at the TOP of the Progress Note on the right.       Thank you,  Scott Gómez [address] [place] [address]

## 2017-05-18 NOTE — PROGRESS NOTES
Paged by nurse on staff for hypotension/dizziness and some chest tightness. EKG completed - no changes. Placed on 2L NC O2. BP low 88/55. Giving 500mL fluid bolus now. Will reassess after.      Jarad Robertson PA-C  [facility]

## 2017-05-18 NOTE — PROGRESS NOTES
[facility] - Progress Note  Admit Date: 5/17/2017    Assessment:     Hospital Problems  Date Reviewed: 5/4/2017          Codes Class Noted POA    Unstable angina ([facility] [address] ICD-10-CM: I20.0  ICD-9-CM: 411.1  5/17/2017 Unknown              -Unstable angina (SOB and chest tightness) with abnormal outpatient nuclear scan, presented for elective cardiac cath. -CAD with heavy calcified tortuous prox and mid 90-95% LAD s/p POBA. %  with distal collateralization. LM normal. Diag normal. Cx 30-40% mid. OM normal. Her distal target for the LAD is quite small and diffusely diseased. Do not feel that she is a good candidate for coronary artery bypass graft surgery for single-vessel bypass. The distal RCA is collateralized but it appears to be chronic. 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  -Hypertension.  -Hiatal hernia and GERD on zantac and gaviscon as outpatient. Plan:     Would continue ASA, plavix, BB. Check lipid panel and start statin as indicated. Will wean off nitro drip and discontinue heparin drip today. Will increase activity and plan to monitor throughout today. Will optimize medical regimen and if remains CP free will plan discharge tomorrow. If recurrent unstable    Subjective:     GI discomfort yesterday evening.      Objective:      Patient Vitals for the past 8 hrs:   Temp Pulse Resp BP SpO2   05/18/17 0823 97.6 °F (36.4 °C) 72 18 108/68 93 %   05/18/17 0400 98.2 °F (36.8 °C) 69 20 99/62 96 %         Patient Vitals for the past 96 hrs:   Weight   05/18/17 0532 68 kg (150 lb)   05/17/17 0931 63.5 kg (140 lb)                    Intake/Output Summary (Last 24 hours) at 05/18/17 0835  Last data filed at 05/18/17 0133   Gross per 24 hour   Intake          1235.37 ml   Output              200 ml   Net          1035.37 ml       Physical Exam:  General:  alert, cooperative, no distress, appears stated age  Neck:  no JVD  Lungs:  clear to auscultation bilaterally  Heart:  regular rate and rhythm, S1, S2 normal, no murmur, click, rub or gallop  Abdomen:  abdomen is soft without significant tenderness, masses, organomegaly or guarding  Extremities:  extremities normal, atraumatic, no cyanosis or edema. Right groin without hematoma with full peripheral pulses.     Data Review:     Labs: Results:       Chemistry Recent Labs      05/17/17   1945  05/15/17   1330   GLU  111*  88   NA  140  140   K  3.7  4.1   CL  108  107   CO2  22  25   BUN  15  21*   CREA  1.01  1.28   CA  9.3  9.7   AGAP  10  8   BUCR  15  16   AP   --   86   TP   --   7.2   ALB   --   3.9   GLOB   --   3.3   AGRAT   --   1.2      CBC w/Diff Recent Labs      05/17/17   1945  05/15/17   1330   WBC  8.0  5.3   RBC  4.27  4.36   HGB  12.9  13.2   HCT  38.1  38.9   PLT  144  167   GRANS  85*  61   LYMPH  10*  27   EOS  0  3      Cardiac Enzymes No results found for: CPK, CKMMB, CKMB, RCK3, CKMBT, CKNDX, CKND1, SIVAN, TROPT, TROIQ, JOSE, TROPT, TNIPOC, BNP, BNPP   Coagulation Recent Labs      05/18/17   0523  05/17/17   1945  05/15/17   1330   PTP   --    --   13.3   INR   --    --   1.0   APTT  95.9*  95.2*   --        Lipid Panel Lab Results   Component Value Date/Time    Cholesterol, total 208 07/16/2010 12:00 PM    HDL Cholesterol 52 07/16/2010 12:00 PM    LDL, calculated 109.8 07/16/2010 12:00 PM    VLDL, calculated 46.2 07/16/2010 12:00 PM    Triglyceride 231 07/16/2010 12:00 PM    CHOL/HDL Ratio 4.0 07/16/2010 12:00 PM      BNP No results found for: BNP, BNPP, XBNPT   Liver Enzymes Recent Labs      05/15/17   1330   TP  7.2   ALB  3.9   AP  86   SGOT  23      Digoxin    Thyroid Studies Lab Results   Component Value Date/Time    T4, Total 10.0 07/16/2010 12:00 PM    T3 Uptake 38 07/16/2010 12:00 PM    TSH 3.09 07/16/2010 12:00 PM          Signed By: STUART Salmeron     May 18, 2017

## 2017-05-19 PROBLEM — I25.5 ISCHEMIC CARDIOMYOPATHY: Status: ACTIVE | Noted: 2017-01-01

## 2017-05-19 PROBLEM — I25.10 CAD (CORONARY ARTERY DISEASE): Status: ACTIVE | Noted: 2017-01-01

## 2017-05-19 NOTE — DISCHARGE INSTRUCTIONS
Patient armband removed and shredded    DISCHARGE SUMMARY from Nurse    The following personal items are in your possession at time of discharge:    Dental Appliances: None  Visual Aid: None     Home Medications: Sent home     Clothing: At bedside                PATIENT INSTRUCTIONS:    After general anesthesia or intravenous sedation, for 24 hours or while taking prescription Narcotics:  · Limit your activities  · Do not drive and operate hazardous machinery  · Do not make important personal or business decisions  · Do  not drink alcoholic beverages  · If you have not urinated within 8 hours after discharge, please contact your surgeon on call. Report the following to your surgeon:  · Excessive pain, swelling, redness or odor of or around the surgical area  · Temperature over 100.5  · Nausea and vomiting lasting longer than 4 hours or if unable to take medications  · Any signs of decreased circulation or nerve impairment to extremity: change in color, persistent  numbness, tingling, coldness or increase pain  · Any questions        What to do at Home:  Recommended activity: Activity as tolerated, per physician order    If you experience any of the following symptoms Redness, Bleeding, drainage, fever greater than 101, please follow up with Emergency Dept. *  Please give a list of your current medications to your Primary Care Provider. *  Please update this list whenever your medications are discontinued, doses are      changed, or new medications (including over-the-counter products) are added. *  Please carry medication information at all times in case of emergency situations. These are general instructions for a healthy lifestyle:    No smoking/ No tobacco products/ Avoid exposure to second hand smoke    Surgeon General's Warning:  Quitting smoking now greatly reduces serious risk to your health.     Obesity, smoking, and sedentary lifestyle greatly increases your risk for illness    A healthy diet, regular physical exercise & weight monitoring are important for maintaining a healthy lifestyle    You may be retaining fluid if you have a history of heart failure or if you experience any of the following symptoms:  Weight gain of 3 pounds or more overnight or 5 pounds in a week, increased swelling in our hands or feet or shortness of breath while lying flat in bed. Please call your doctor as soon as you notice any of these symptoms; do not wait until your next office visit. Recognize signs and symptoms of STROKE:    F-face looks uneven    A-arms unable to move or move unevenly    S-speech slurred or non-existent    T-time-call 911 as soon as signs and symptoms begin-DO NOT go       Back to bed or wait to see if you get better-TIME IS BRAIN. Warning Signs of HEART ATTACK     Call 911 if you have these symptoms:   Chest discomfort. Most heart attacks involve discomfort in the center of the chest that lasts more than a few minutes, or that goes away and comes back. It can feel like uncomfortable pressure, squeezing, fullness, or pain.  Discomfort in other areas of the upper body. Symptoms can include pain or discomfort in one or both arms, the back, neck, jaw, or stomach.  Shortness of breath with or without chest discomfort.  Other signs may include breaking out in a cold sweat, nausea, or lightheadedness. Don't wait more than five minutes to call 911 - MINUTES MATTER! Fast action can save your life. Calling 911 is almost always the fastest way to get lifesaving treatment. Emergency Medical Services staff can begin treatment when they arrive -- up to an hour sooner than if someone gets to the hospital by car. The discharge information has been reviewed with the patient. The patient verbalized understanding. Discharge medications reviewed with the patient and appropriate educational materials and side effects teaching were provided.

## 2017-05-19 NOTE — DISCHARGE SUMMARY
[facility]  -  Progress Note      Discharge Summary     Patient: Sukhdev Ramirez MRN: [id number]  SSN: [id number]    YOB: 1935  Age: 80 y.o. Sex: female       Admit Date: 5/17/2017    Discharge Date: 5/19/2017      Admission Diagnoses: Abnormal findings on diagnostic imaging of heart and coronary circulation [R93.1]    Discharge Diagnoses:   Problem List as of 5/19/2017  Date Reviewed: 5/4/2017          Codes Class Noted - Resolved    CAD (coronary artery disease) ICD-10-CM: I25.10  ICD-9-CM: 414.00  5/19/2017 - Present    Overview Signed 5/19/2017  8:47 AM by STUART Reyna     CAD by cath 5/17/17 with heavy calcified tortuous prox and mid 90-95% LAD s/p POBA. %  with distal collateralization. LM normal. Diag normal. Cx 30-40% mid. OM normal. Her distal target for the LAD is quite small and diffusely diseased. Do not feel that she is a good candidate for coronary artery bypass graft surgery for single-vessel bypass. The distal RCA is collateralized but it appears to be chronic. Ischemic cardiomyopathy ICD-10-CM: I25.5  ICD-9-CM: 414.8  5/19/2017 - Present    Overview Signed 5/19/2017  8:48 AM by STUART Reyna     with EF 40% with inferobasal akinetic diagnostic segmental wall motion abnormality by LV gram 5/17/17. Unstable angina (HCC) ICD-10-CM: I20.0  ICD-9-CM: 411.1  5/17/2017 - Present        Urinary incontinence ICD-10-CM: R32  ICD-9-CM: 788.30  9/24/2014 - Present        Renal mass ICD-10-CM: N28.89  ICD-9-CM: 593.9  9/24/2014 - Present        Renal cyst ICD-10-CM: N28.1  ICD-9-CM: 753.10  9/24/2014 - Present        Overactive bladder ICD-10-CM: N32.81  ICD-9-CM: 596.51  9/24/2014 - Present        Stress incontinence ICD-10-CM: N39.3  ICD-9-CM: [id number]  9/24/2014 - Present               Discharge Condition: Good    Hospital Course: Patient with unstable angina (CP and SOB) presented for elective cardiac catheterization 5/17/2017. Patient was found to have CAD with heavy calcified tortuous prox and mid 90-95% LAD s/p POBA. %  with distal collateralization. LM normal. Diag normal. Cx 30-40% mid. OM normal. Her distal target for the LAD is quite small and diffusely diseased. Do not feel that she is a good candidate for coronary artery bypass graft surgery for single-vessel bypass. The distal RCA is collateralized but it appears to be chronic. Patient was monitored overnight on nitro and heparin infusion without recurrent anginal pain however she did have GI symptoms which resolved with gaviscon and zantac. Her BP was noted to be low but asymptomatic. Nitro drip was stopped. She was hydrated and BP improved. Patient was kept in house additional night for further monitoring. Will ambulate today off nitro and heparin drip and if asymptomatic will plan to discharge home. Right radial cath site without hematoma. Patient will be discharged on ASA, plavix, statin, BB and SLN. Clonidine will be stopped secondary to borderline BP. Consults: None    Significant Diagnostic Studies: angiography:     FLUOROSCOPY: There was severe significant calcification.     HEMODYNAMIC / ANGIOGRAPHIC DATA  · Left ventricle: End diastolic pressure was 7 mmHg. Left ventriculography: The EF was estimated to be around 40 %. There was inferobasal akinetic diagnostic segmental wall motion abnormality. · Aorta: There was no significant systolic pressure gradient across the aortic valve. · Mitral valve: There was no significant mitral regurgitation. · Coronary Angiography:  · The coronary circulation was right dominant. · Left main: Angiographically normal.  · Left anterior descending: Heavily calcified, tortuous, proximal and mid serial 90% to 95%. · Diagonal Branches: Angiographically normal.  · Circumflex: Mid 30-40%.   · Obtuse Marginal Branches: Angiographically normal.  · Right coronary: Mid 100%  with distal segments collateralized from left.     INTERVENTION:  A weight adjusted loading dose of IV Angiomax was given and infusion of the drug was started . Using a 6 [employer] [place] XB 3.5 guiding catheter, the lesion in the LAD coronary artery was crossed with a 0.014\" Cross-it 100 guide wire. Predilatation was performed with 2.5 mm PTCA balloon ( 15 mm length). Stenting was not performed due to unclear whether the wire was in the true lumen. The distal segment was collateralized from circumflex. Therefore, patient remained hemodynamically stable. After the balloon dilatation, decision was made to continue medical therapy. If she has recurrent chest pains, she can be brought back for repeat angioplasty attempt. Her distal target for the LAD is quite small and diffusely diseased. I do not feel that she is a good candidate for coronary artery bypass graft surgery for single-vessel bypass. The distal RCA is collateralized but it appears to be chronic. The inferobasal segment of the left ventricle is akinetic consistent with transmural myocardial infarction. Intracoronary NTG was used during the procedure. Excellent angiographic results were observed and VERNA 3 flow was noted after the PCI. After an appropriate waiting phase final coronary angiograms were obtained and the catheter was withdrawn. Oral antiplatelet therapy was administered in the cardiac cath lab in the form of aspirin 325 mg and 300 mg Plavix. The procedure was well tolerated and there was no apparent immediate complication. Hemostasis was performed using Angioseal device. The patient was transferred to the observation area with stable vital signs and in an asymptomatic state. SUMMARY: Balloon angioplasty of subtotal, proximal mid LAD 90-95% stenosis.  No apparent immediate complication.     Moderate sedation was utilized for 90 minutes using Versed and fentanyl under my supervision.     RECOMMENDATIONS: Post-procedure care will focus on aggressive risk factor modification.      Benefits of tobacco abstinence discussed.     Electronically signed Petey Hernandez MD          Disposition: home    Discharge Medications:   Current Discharge Medication List      START taking these medications    Details   clopidogrel (PLAVIX) 75 mg tab Take 1 Tab by mouth daily. Qty: 30 Tab, Refills: 11      metoprolol tartrate (LOPRESSOR) 50 mg tablet Take 1 Tab by mouth every twelve (12) hours. Qty: 60 Tab, Refills: 11      nitroglycerin (NITROSTAT) 0.4 mg SL tablet 1 Tab by SubLINGual route every five (5) minutes as needed for Chest Pain. Qty: 1 Bottle, Refills: 2      simvastatin (ZOCOR) 20 mg tablet Take 1 Tab by mouth nightly. Qty: 30 Tab, Refills: 11         CONTINUE these medications which have NOT CHANGED    Details   aspirin delayed-release 81 mg tablet Take 81 mg by mouth daily. garlic 1,745 mg cap Take 1 Cap by mouth daily. polyethylene glycol (MIRALAX) 17 gram/dose powder Take 17 g by mouth daily. traMADol (ULTRAM) 50 mg tablet Take 50 mg by mouth every six (6) hours as needed for Pain. raNITIdine hcl 150 mg capsule Take 300 mg by mouth two (2) times a day. Qty: 60 Cap, Refills: 12      isradipine (DYNACIRC) 5 mg capsule Take 5 mg by mouth daily. Indications: Hypertension      levothyroxine (SYNTHROID) 25 mcg tablet Take  by mouth Daily (before breakfast). loratadine 10 mg cap Take  by mouth. FOLIC ACID/MULTIVITS-MIN/LUT (CENTRUM SILVER PO) Take  by mouth.          STOP taking these medications       cloNIDine HCl (CATAPRES) 0.1 mg tablet Comments:   Reason for Stopping:               Activity: Activity as tolerated  Diet: Cardiac Diet  Wound Care: As directed    Follow-up Appointments   Procedures    FOLLOW UP VISIT Appointment in: One Month Dr. Alberta Downing     Standing Status:   Standing     Number of Occurrences:   1     Order Specific Question:   Appointment in     Answer:   One Month       Signed By: STUART Simon     May 19, 2017

## 2017-05-22 NOTE — TELEPHONE ENCOUNTER
Cardiac Rehab called patient and talked with her about the program. She is interested and wants to make sure her insurance covers the program. Will follow up with her.     Thank you,  Judie Cedillo

## 2017-05-22 NOTE — TELEPHONE ENCOUNTER
Cardiac Rehab called patient and explained her insurance benefits to her.  She is ready to start the program.    Thank you,  Julio Cesar Field

## 2017-05-25 NOTE — TELEPHONE ENCOUNTER
Ms. Monster Lopez is scheduled for her initial Cardiac Rehab evaluation on Tuesday, May 30, 2017 at 12:30.     Thank you,  Rayray Maynard

## 2017-05-30 NOTE — PROGRESS NOTES
[facility]  Outpatient Cardiac Rehabilitation  Patient Assessment and Treatment Plan    DEMOGRAPHIC & RELATED DATA Patient Name: Evelina Ellison  [unfilled]  [unfilled]     1935 Age  80 y.o. Social Security #  /Medical Record #  [id number]  [id number]   Sex  F Marital Status     Emergency Contact Name  Extended Emergency Contact Information  Primary Emergency Contact: Marylee Fiddler A  Address: [address] [place] [address], [address] [place] [address] Street [address] [place] [address] Phone: [phone]  Relation: Spouse  Secondary Emergency Contact: Julian MUNSON  Home Phone: [phone]  Relation: Spouse Phone (Area Code) Number     Emergency Contact Address     REFERRAL Source Name:  Malini Arredondo MD  [address]  [place], [facility] [address] Phone           Number   Referring Diagnosis  POST PCI   Chief Complaint  SOB, [address] PCP Name:    Daniele Mcdonough MD Phone      (Area Code)      Number       Address: To use this SmartLink, specify the provider ID whose address you want to display, e.g., . PROVADDR[1 (where 1 is the provider ID). @ FAX         (Area Code)      Number       Cardiologist or Referring Physcian Name:  Dr. Yamilet Lovett Phone      (Area Code)      Number       Address: FAX         (Area Code)      Number       [address] If Yes, Agency Name  No (validate)   Phone      (Area Code)      Number       [] Yes    [x] No Address FAX         (Area Code)      Number       PATIENT REPORTING OF MEDICAL HISTORY Check All that Apply   Sleep Symptoms Daily Symptoms Additional Medical Disorders     [x] Within normal limits   [] Nocturnal  Hypoxemia     [] Obstructive Sleep           Apnea     [] Increased sleep   [] Decreased sleep   [] Night Sweats   [] Insomnia   []Frequent Urination   []  Muscle Spasms    [] Daytime Sleepiness   [] Shortness of breath   [] Nightmares   [] Leg Jerking   [] Snoring   [x] Sleeps in      chair/sofa due to shortness of breath     [x] Coughing   [] Heart Palpitations   [] Feather Pillows used    [] Excessive               worry   [] Nervousness   [] Panic episodes   [] Depressed   [] Cough   [] Cough with              Sputum     [] Shortness of             breath at rest     [] Wheezing     [] Chest Pain  [x] Shortness of        breath with           mild exertion     [x] Shortness of        breath with            moderate        exertion     [x] Weakness /        Fatigue   [x] Lacking       endurance   [] Presence of         barrel chest   [] Utilizes                    shoulders,      chest & neck to aid in breathing     []  Dizziness     [x] Ankle swelling     [] Hoarseness   [] Hypoxemia  [] Diabetes  [] Neuropathy  [x] Hypertension  [x] Osteoarthritis  [] Osteoporosis  [] Rheumatoid Arthritis    [] Fibromyalgia  [] Scoliosis  [x]Coronary Artery Disease   [] Atrial Fibrillation []Abnormal Posture   [x] Debility   [] CHF   [] Family history of COPD:     [] Other:     RESPIRATORY HOME CARE EQUIPMENT   Use of the following:  []- Peak Flow Meter    []- Aerosol machine     []- Suction machine     []- Ventilator    []- Mechanical chest percussor    []- PEP valve   []- Oxygen:   Flow Rate:     [x]- None                                                                                       System:       See Summary List for additional Medical and Surgical History, Medications, and Allergies     CARDIAC  HISTORY   MI                              []- Yes     [x]- No      If yes, when? CABG                        []- Yes     [x]- No      If yes, when? Valve Replacement   []- Yes     [x]- No      If yes, when? PTCA/Stent               [x]- Yes     []- No      If yes, when? 5/17    Pacemaker                []- Yes     [x]- No      If yes, when?           How often do you see your Cardiologist:  []- Every Month    []- Every Quarter   [x] Every Six Months  []- Other:      Typically how often do you retain fluid (ie Swelling in ankles, over night weight gain, etc)? At times    Number of times youve been hospitalized due to cardiac condition (in last year):1    Number of times youve visited the E.D. due to cardiac conditions (in last year): 2    Shortness of breath? [x]- Yes     []- No  If yes, alleviated by    VACCINATIONS   [x] Flu    [x]  Pneumonia    [x] Tetanus     IF NOT, Staff recommendation:       There is no immunization history on file for this patient. OCCUPATIONAL / ENVIRONMENTAL HISTORY Retired? [x] YES            [] NO Type of work:      Occupational exposure: [x] None [] Welding   [] Asbestos    [] Mines/foundry   [] Pottery      [] Other   Residence  Type [x]     House                   []     Apartment                 []     [facility] [address]                [facility]       []     Residential Living                []     Other: # Occupants2 Relationship with Residents:         # Levels:    2   # Stairs between levels: 14      # Stairs to enter:3            Household pets:    []   Yes   [x]   No    If so, type:     Comments:   EDUCATIONAL HISTORY Highest Grade Achieved: Grade:             College Degree(s) Obtained / Major: BS   Orientation:   [x]- Time       [x]- Place      [x]- Person    [x]- Situation     If disoriented in any sphere, describe:  Prefers to Learn By:  []- Audio         [x]- Visual      [x]- Demonstration []- Printed Material       []- No Preference                    Barriers to Learning:   [x]- None    []- Language    []- Vision     []- Hearing    []- Emotional      []- Cognitive         Other (Describe):    ETHNIC / CULTURAL ISSUES Primary Language: [x]- English       Other  Specify:       Any Latter-day, Ethnic or Cultural Practices/ Beliefs that May Influence Treatment?        []- Yes     [x]- No     If Yes, Describe:     HEARING/VISION/MOBILITY/ADLS Prosthesis:   [x]- None   Describe:   []- Hearing Impaired   ð Hearing Aids:   [x]- None     []-Bilaterally     []- Right []- Left   []- Visually Impaired  ð  []- Legally Blind       []- Glasses       []- Contact Lenses   Ambulation:   [x]- By Self   []- Ambulates with Assistance ð  []- Cane    []- Crutches     []- Larance Eng     []- Wheelchair    [] - Unable to Ambulate   Adaptive Equipment:     []- None      [x]- Shower Seat       [x]- Grab Bars      []- Reachers     [x]-Non-Slip Floor Mat   Comments:    PAIN ASSESSMENT  Numeric Pain Scale 1-10 Is pain present or has it been present within the past 6 months? YES     Chest Pain:  []- Yes     [x]- No    If yes, rating:    Level at which pain is unacceptable:   Freq/Duration:     Aggravated by: Alleviated by:      Other Pain Location and Rating:  []- Fingers ____  []- Hand ____  []- Wrist ____  []- Elbow ____  []- Shoulder ____     []- Neck ____ [x]- Back ____   []- Hip ____  []- Leg ____ []- Knee ___ []- Ankle ____   []- Foot ___ []- Toes ____     []- Other _____________    Level at which pain is unacceptable:>4          Frequency/Duration:at times     Aggravated by: BENDING, WALKING      Alleviated by: REST      RISK STRATIFICATION  FOR EXERCISE PARTICIPATION  (as identified by AACVPR)   [x]- Low               []- Medium               []-High    Comments:      FALL RISK ASSESSMENT If 3 or more are checked, patient requires Fall Precautions   [] -  History of Fall(s) within last 3 months   [] -  Altered Mental Status - altered level of consciousness, confused/disoriented, unable to understand/remember, cognitive impairment, or alcohol use   [] -  Altered Mobility - unsteady gait, requires use of assistive device (cane, wheelchair, walker), or requires staff assistance   [x] -  Predisposing Diseases/Diagnosis - sensory impairments, neurological disorder, hypotension, orthostatic hypotension, vertigo, seizures, arthritis/osteoporosis, or age less than 3 years or greater than 65 years   [] -  Environment  IV, ECG leads, O2, [place]   [x] -  Medications  does patient take 4 or more home medications     FUNCTIONAL ACTIVITIES OF DAILY LIVING  Check All Deficits related to shortness of breath, fatigue, pain and/or emotional or psychological elements   Functional Mobility Bathing/Grooming/Dressing Household activities Meal/yard maintenance   [] Bed transfers  [] Chair transfers  [] Toilet transfers  [] Automobile transfers  [] Tub/shower transfers  [x] Ambulation on level ground  [x] Ambulation on slight incline  [x] Stairs  [x]  Carrying objects  [x]  Grocery shopping  []  Walking to mailbox []  Bathing / [place]  []  Washing hair  []  Washing / Drying upper body  []  Washing / Drying lower body  []  Combing hair  []  Brushing teeth  [] Make-up application  []  Shaving  []  Upper body dressing  []  Lower body dressing  []  Donning / [place] socks and/or shoes [x] Vacuuming  [] Mopping  [] Sweeping  [] Garbage removal  [x] Making bed  [x] Changing sheets  [] Placing / Retrieving clothes in /  out washer    [] Placing / Retrieving clothes in /  out dryer    [] Ironing  [] Sorting / Folding clothes  [] Hanging clothes [] Light meal preparation  [] Cooking  [] Eating  [] Clean-up  [] Washing dishes  [] Loading / unloading     [x] Pulling weeds  [x] Planting flowers  [x] Gardening  [x] Raking leaves  [x] Mowing grass  [] Painting   PATIENT REPORT OF PRIOR FUNCTIONAL LEVEL  In patients words, what could he/she do before diagnosis and/or event?  sob   PATIENT REPORT OF CHANGE(S) IN FUNCTIONAL LEVEL In patients words, what has changed since diagnosis and/or event? Still sob   TRANSPORTATION CAPABILITY   []- Able to provide own transportation. [x]- Patient drives    []- Lack of reliable transportation. []- Medically unable to drive. []- Has a handicapped sticker    []- Patient has someone to drive them if needed    []- Financial constraints prohibit ability to provide own  transport.    Determination of Need for Transportation (Check all that apply)       TOBACCO USE Current   Use? Age Began Route Usual Daily Amount / Frequency Date Last Used    no   History   Smoking Status    Never Smoker   Smokeless Tobacco    Never Used       Are you willing to participate in a Smoking Cessation Program? Not applicable NOTE:  Patients who currently smoke & are unwilling to stop tobacco use and/or participate in a smoking cessation program might be ineligible for services     Comments:      EDUCATIONAL NEEDS Check All that Apply   HEART DISEASE Lacks understanding of components of disease yes    Lacks understanding of disease and risk factors yes    Other:     [place] Rate of Perceived Exertion (RPE) Scale of ? ten (10) while resting? yes   Breathing Exhibits abnormal respiratory rate? yes    Lacks independent breathing techniques? yes    Rests arms on table to recruit accessory muscles? yes   Heart Failure Diagnosed as Heart Failure? []- N/A  yes    Complains of fluid retention? []- N/A   yes    Exhibits knowledge deficit related to proper diet? []- N/A  yes    Exhibits knowledge deficit related to proper fluid intake? []- N/A   yes    Other:     Non-compliant with medications? no    Exhibits knowledge deficit related to side effects of medication? no   Medications Exhibits knowledge deficit related to use and purpose of medications? yes    Prescribed supplemental Oxygen                                           []- N/A   yes    Exhibits knowledge deficit related to oxygen use? []- N/A  no   Travel & Exhibits allergic reaction to allergens (pets, plants, dust, mold, etc.)? no   Enviroment Travel restricted/limited due to disease? no   SEXUALITY & HEART DISEASE Patient desires information on sexuality with heart disease? no   COLLABORATIVE  Exhibits > one (1) infections(s) in the past year?  no   SELF-MANAGEMENT  Lacks evidence of self-monitoring signs for infections? no   STRESS MANAGEMENT/  EMOTIONAL HEALTH Patient exhibits or reports increased levels of stress? no    Patient does not practice relaxation techniques? yes     PSYCHOSOCIAL ASSESSMENT Mental and Emotional functioning impacted by deficits, limitations, and/or symptoms associated with the patients pulmonary disease        Attitude:   [x] - Cooperative    [] - Guarded    [] - Uncooperative      [] - Hostile        [] - Apathetic       [] - Defensive            Self-concept:   [x] - Intact     [] - Helpless    [] - Hopeless   [] - Grandiose   [] - Self-deprecating   [] - Depersonalized            Consciousness: [x] - Alert   [] - Hyper-alert  [] - Lethargic  [] - Clouded  [] - Unresponsive  [] - Lacks focus/attention            Mood:    [x] - Stable     [] - Depressed   [] - Anxious   [] - Irritable    [] - Angry    [] - Calm   [] - Euphoric   [] - Guilty                [] - Grieving                   Areas of function acutely / negatively affected by patients pulmonary disease:   [] - Marital/Intimacy/Family  [] - Financial      [] - Safety     [] - Spiritual      [] - Sexual   [] - Activities of Daily Living   [] - Vocational            [] - Social             [] - Recreation/Leisure        Aspects of family and home situation that may affect treatment:       Negatively  [] - Lacks support system      [] - Financial      [] - Legal problems    [] - Experiences Isolation                           [] - Dependent care    [] - / spouse/significant other    [] - Other health issues     Positively  [] - Spiritual  [x] - Gordon, caring, support system  [x] - Living spouse or significant other                    [x] - Socially included                 Evidence of abuse/neglect:   [] - Verbal   [] -Physical   [] - Emotional   [] - Sexual    [] - Substance   [] - Addictive                                                  [] - Financial        Does the patient report any of the following?   [] - Depression    [] - Anxiety       [] - Panic      [x] - Poor Coping       How much stress does the patient report? [] - None       [x] - Low         [] - Moderate      [] - High         Please see attached [facility] COOP (Quality of Life) and PHQ-9 (Depression Tool). Exercise (REQUIRED)    Initial Assessment    Levon Mi RN  5/30/2017,10:27 AM Initial Plan    Goals/Interventions/Education  (Exercise Prescription) 30-Day Re-Assessment/POC    Date/Time:   Initials:  60-Day Re-Assessment/POC    Date/Time:   Initials:      Tool  Six-minute walk test    Initial Walk:  Distance: 887    Pre- Vitals:  HR:70        O2 Sat: 97   BP: 121/75        L of O2:0  RPE: 6     RPD: 1     RPP:0      During Vitals:  HR: 99      O2 Sat:  95  BP:  na      L of O2:0  RPE:11     RPD:2      RPP:0    Post Vitals:  HR: 74      O2 Sat: 98   BP:128/73        L of O2:0  RPE: 6    RPD: 1     RPP:0  Speed: 1.6   Met Level:  2.2    Assistive Device:    []  Yes   [x]  No            Rest Breaks: []Yes  [x] No    If yes, amount of rest time:              Pain:   []  Yes   [x]  No     Comments:        []  Appropriate  BP / HR / O2 response to exercise    Stage of Change: preparation    Discharge Walk:  Distance     Pre- Vitals:  HR:       O2 Sat:    BP:        L of O2:  RPE:     RPD:      RPP:    During Vitals:  HR:       O2 Sat:    BP:        L of O2:  RPE:     RPD:      RPP:    Post Vitals:  HR:       O2 Sat:    BP:        L of O2:  RPE:     RPD:      RPP:  Speed:    Met Level:    Assistive Device:   []  Yes []  No            Rest Breaks: [] Yes  []No    If yes, amount of rest time:            Pain:   [] Yes   []  No          Comments:       Date/Time:  Initials: Short-term goals (to be achieved in 4 weeks):  [x] Compliant with Exercise Prescription > 80% of time   [x] Participation in identified educational sessions  Other:     Exercise Pres./Interventions  Aerobic Mode:   [x] Treadmill  [x] Bike  [x] NuStep  [x] Arm Ergometer  []  Other: Frequency:2-3 days/week  Duration: Up to 61 Min per mode   Intensity: RPE 6    To   13    ;  RPD: <  3          Strength training Mode:  [x] Free  Weights/Thera-bands  Frequency:2-3 days/week  Duration:1-2 sets/10-15 reps   Intensity: RPE:13    Progression: 1-3 lbs/UE, 2-5 lbs/LE    Flexibility Mode:  [x]  Static stretch  Frequency:2-3 days/week  Duration: 20-30 sec/stretch  Intensity: slight discomfort  Progression: N/A    Education Topics (see education log):  [x]  RPD/RPE/Pain scale  [x]  Exercise: Safety & Monitoring  [x]  Home Exercise Plan  [x]  Collaborative Self-Mgt  []  Other:     LTG (to be achieved by D/C):  [x] Aerobic exercise >150  Min. Weekly with Met levels >2     , resistance training 2-3 days/week  [x] Increase 6MW test by 50feet *Please see recent flow sheets  STG Status:  Compliant with Exercise Prescription [] Yes []No If no, describe:      Participation in educational sessions. [] Yes   [] No          If no, describe: Other:Describe:     Updated Exercise Prescription  Aerobic Mode:   []  Treadmill  [] Bike  [] NuStep  []  Arm Ergometer  []  Other:   Frequency:2-3 days/week  Duration: Up to    Min per mode   Intensity RPE     To       ;  RPD: <          Strength training Mode:  []  Free Weights/Thera-bands  Frequency: 2-3 days/week  Duration: 1-2 sets/10-15 reps   Intensity: RPE:     Progression: 1-3 lbs/UE, 2-5 lbs/LE    Flexibility Mode:  []  Static stretch  Frequency: 2-3 days/week  Duration: 20-30 sec/stretch  Intensity: slight discomfort  Progression: N/A  LTG (to be achieved by D/C):    [] Aerobic exercise >   Min. Weekly with Met levels >     , resistance training 2-3 days/week    [] Increase 6MW test by feet *Please see recent flow sheets  STG Status:  Compliant with Exercise Prescription. [] Yes   []No          If no, describe:      Participation in educational sessions. [] Yes   [] No          If no, describe:    Other: Describe:    Updated Exercise Prescription  Aerobic Mode:   []  Treadmill  []  Bike  []  NuStep  []  Arm Ergometer  []  Other:    Frequency: 2-3 days/week  Duration Up to    Min per mode   Intensity RPE     To       ;  RPD: <                  Strength training Mode:  []  Free Weights/Thera-bands  Frequency: 2-3 days/week  Duration: 1-2 sets/10-15 reps   Intensity: RPE:     Progression: 1-3 lbs/UE, 2-5 lbs/LE    Flexibility Mode:  []  Static stretch  Frequency: 2-3 days/week  Duration: 20-30 sec/stretch  Intensity: slight discomfort  Progression: N/A      LTG (to be achieved by D/C):    [] Aerobic exercise >   Min. Weekly with Met levels >     , resistance training 2-3 days/week    [] Increase 6MW test by feet     Nutrition (REQUIRED)           Initial Assessment     Levon Burnett RN  5/30/2017,10:27 AM Initial Plan Goals/Intervention/Education  30 Day Re-assessment/POC    Date/Time:   Initial:   60 Day Re-assessment/POC    Date/Time:    Initial:        Tool  Rate Your Plate    Pre -RYP Score: 62   (Healthy Diet >57)    Special Diet? []  Yes  [x]  No          If yes, describe:        Caffeine Intake? [x]  Yes   []  No          If yes, amount: 1cup/day               Alcohol Intake? []  Yes   [x] No          If yes, amount:      Current Weight:  142   Current Height:5'2    Current BMI: There is no height or weight on file to calculate BMI. To calculate BMI:          Weight (pounds)           Height (inches)2   x 703 =              <18.5 = Underweight      18.5-24.9 = Normal       25-29.9 = Overweight     >30 = Obese  Stage of Change:  []  Pt is in pre-contemplation  []  Pt is in contemplation  [x]  Pt is in preparation  []  Pt is in action  []  Maintenance Short-Term Goals (to be achieved in 4 weeks):  [x]  Compliant with            prescribed, specialized      diet.      [x]  Participate in nutritional       health classes    []  Improve RYP score to:  (if checked, RYP must be reassessed at 30 days)    Other:        Interventions:  [x]  Review Eating Habits  [x]  Review Nutritional      Screening  [x]  Encourage Heart Healthy             Diet  []  Recommended or requested 1:1 Dietary Consult        Education Topics (see educational log):  [x]  Nutritional health Benefits      LTG (to be achieved by discharge):  [x] Patient will verbalize an understanding of life-long adherence to a healthy diet and the impact on health conidtion(s). *Please see recent flow sheets  STG Status:  Compliant with prescribed, specialized diet  % of the time per patient report. Participate in nutritional health classes:   []  Yes   []  No          If no, describe:      Current RYP score: Other: Describe:      Dietician Consult Completed    []  Yes   []  No   []  N/A    UPOC:  Pt will continue to:   []  Be compliant with prescribed,  specialized diet    [] Participate in nutritional     health classes. []Improve RYP score to:     RYP needs to be reassessed if still active as STG      LTG (to be achieved by discharge):  [] Patient will verbalize an understanding of life-long adherence to a healthy diet and the impact on health conidtion(s). *Please see recent flow sheets  STG Status:  Compliant with prescribed, specialized diet  % of the time per patient report. Participate in nutritional health classes. []  Yes   []  No          If no, describe:      Current RYP score: Other: Describe: Other:      Dietician Consult Completed  []  Yes   []  No   []  N/A    UPOC:  Pt will continue to:   []  Be compliant with prescribed, specialized diet. []  Participate in nutritional  health classes. []Improve RYP score to:     RYP needs to be reassessed if still active as STG    LTG (to be achieved by discharge):  [] Patient will verbalize an understanding of life-long adherence to a healthy diet and the impact on health conidtion(s).    Pscyhosocial (REQUIRED)    Initial Assessment  Cheyanne Benton RN  5/30/2017,10:27 AM Initial Plan    Goals/Intervention/Education 30-Day Re-Assessment/POC      Date/Time:   Initials:  60-Day Re-Assessment/POC    Date/Time:   Initials: Tool (Quality of Life)  [facility] COOP*  *Please see attached. Tool (Depression)  PHQ 9  Score:0   * If score is ? 5, PHQ 9 should be reassessed every 30 days. [x] Poor Coping skills    []  Currently on psychotropic medication    Average Sleep Hours:    []  Abnormal Sleep Patterns (Sleep Apnea; Snoring)    []  Currently Seeing Counselor/Physician    Positive Support System? [x]  Yes   []  No           []  Recommend additional behavioral health assessment    Currently smoking? []  Yes   [x]  No            Stage of Change:   []  Pt is in pre-contemplation  []  Pt is in contemplation  [x]  Pt is in preparation  []  Pt is in action  []  Maintenance Short Term Goals (to be achieved in 4 weeks):   [x]  Manage and reduce stress per patient report. []Improve PHQ 9 score to:      [] Increase average sleep hours by:       [x] Participate in emotional health class. []  Decrease smoking by %. [] Other:     Interventions:   [x] Re-assess PHQ-9 every 30-Day Review if score  > 5   [x] Provide simple relaxation techniques practiced daily     [] Refer behavioral health psychotherapy needs to Physician/licensed psychotherapist.    Education Topics (see education log):   [x] Emotional Health    [] Importance of adequate sleep   [] Smoking cessation    LTG (to be achieved by discharge):   [x] Use relaxation techniques when stressors are identified and verbalize coping skill and decrease vulnerability to stress. [] Maintain decreased smoking behaviors/smoking cessation.   [x] Improve PHQ 9 score  [x] [facility] and maintain a healthy psychosocial well-being *Please see recent flow sheets    STG Status:  Manage and reduce stress per patient report.   []  Yes   []  No          If no, describe:      PHQ 9 current score is:      Average sleep hours: Participate in emotional health class.   []  Yes   []  No          If no, describe:     Decreased smoking by:   %    UPOC:  Pt will continue to:     []  Manage and reduce stress per patient report. []  Improve PHQ 9 score to:    []  Manage sleep as evidenced by average sleep hours of:      [] Participate in emotional health class. []  Decrease smoking by   %    Other:    LTG (to be achieved by discharge):   [] Use relaxation techniques when stressors are identified and verbalize coping skill and decrease vulnerability to stress. [] Maintain decreased smoking behaviors/smoking cessation. [] Improve PHQ 9 score  [] Improve [employer] scores and maintain a healthy psychosocial well-being *Please see recent flow sheets    STG Status:  Manage and reduce stress per patient report.   [] Yes   [] No          If no, describe:     PHQ 9 score is:     Average sleep hours:     Participate in emotional health class. []  Yes []  No          If no, describe:     Decreased smoking by:  %    UPOC:  Pt will continue to:     []  Manage and reduce stress per patient report. [] Improve PHQ 9 score to:     []  Manage sleep as evidenced by average sleep hours of:       []  Participate in emotional health class. []  Decrease smoking by    %    Other:      LTG (to be achieved by discharge):   [] Use relaxation techniques when stressors are identified and verbalize coping skill and decrease vulnerability to stress. [] Maintain decreased smoking behaviors/smoking cessation. [] Improve PHQ 9 score  [] Improve [employer] scores and maintain a healthy psychosocial well-being. Core Measures-  Oxygen     Initial Assessment    Levon Rutledge RN  5/30/2017,10:27 AM Initial Plan    Goals/Interventions/Education 30-Day Re-Assessment/POC    Date/Time:    Initials:    60-Day Re-Assessment/POC    Date/Time:   Initials:       [x]   Not a Risk Factor at This Time. No plan of care required.      []   Oxygen Risk Factor  Evidence of pulmonary/respiratory diagnosis and/or associated symptoms? []  Yes   []  No   Patient is prescribed Oxygen? []  Yes   []  No          If yes, current prescription? Compliant with current prescription? []  Yes   [] No   []  N/A              Understands specifics of Oxygen prescription? []  Yes  []  No   []  N/A              Understands all safety protocols regarding Oxygen usage? []  Yes  []  No    [] N/A              Evidence of de-saturation with activity (six-minute walk test)   []  Yes   []  No              Short-Term Goals (to be achieved in 4 weeks):  []  Compliant with Oxygen prescription. []  Maintain Oxygen saturations >   at rest.  [] Maintain Oxygen saturations >      with exertion. [] Utilize PLB   % of the time without prompting. [] Utilize DB   % of the time without prompting. []   Other:      Interventions:    []   Discuss and review pursed-lip breathing    []   Discuss and review diaphragmatic breathing  []   Monitor oxygen saturations throughout prescribed treatment  [] Maintain patient's oxygen saturation     To    % during exercise and titrate 1-2 L/M until consistently at      %. Education Topics (see educational log):    []  Oxygen importance/compliance   []  Oxygen safety   []  Oxygen and Travel  []  Other:    LTG (to be achieved by discharge)  [] Patient will independently perform pursed-lip breathing  [] Patient will independently perform diaphragmatic breathing  [] Patient will follow Oxygen prescription. *Please see recent flow sheets  Changes in Prescription:  []  Yes   []  No  [] N/A  If yes, describe:    STG Status:  [] Compliant with Oxygen prescription. []  Maintain Oxygen saturations >   at rest.  []   Maintain Oxygen saturations >      with exertion. []   Utilize PLB   % of the time without prompting. [] Utilize DB   % of the time without prompting. []   Other:      UPOC:  Pt will continue to:   [] Be compliant with Oxygen prescription.   []  Maintain Oxygen saturations >   at rest.  []  Maintain Oxygen saturations >  with exertion. [] Practice PLB   % of the time without prompting. []  Practice DB    % of the time without prompting. []  Other:     LTG (to be achieved by discharge)  [] Patient will independently perform pursed-lip breathing  [] Patient will independently perform diaphragmatic breathing  [] Patient will follow Oxygen prescription. *Please see recent flow sheets    Changes in Prescription:  []  Yes   []  No  [] N/A  If yes, describe:    STG Status:  [] Compliant with Oxygen prescription. []  Maintain Oxygen saturations >   at rest.  []   Maintain Oxygen saturations >      with exertion. []   Utilize PLB   % of the time without prompting. [] Utilize DB   % of the time without prompting. []   Other:      UPOC:  Pt will continue to:   []  Be compliant with Oxygen prescription. []  Maintain Oxygen saturations >   at rest.  []  Maintain Oxygen saturations >   with exertion. []   Practice PLB  % of the time without prompting. []   Practice DB   % of the time without prompting. []  Other:      LTG (to be achieved by discharge)  [] Patient will independently perform pursed-lip breathing  [] Patient will independently perform diaphragmatic breathing  [] Patient will follow Oxygen prescription. Core Measures- Congestive Heart Failure  Initial Assessment    Love Garcia RN  5/30/2017,10:27 AM Initial Plan    Interventions/Education/Goals 30-Day Re-Assessment/POC    Date/Time:  Initials:  60-Day Re-Assessment/POC    Date/Time:  Initials:      [x]  Not a Risk Factor at This Time. No plan of care required. []  Risk Factor:     - []  Current Dx of CHF     - []  Hx of CHF    []  EF:     Date:   Source:   NYHA Symptom Class:          []   II          []   III          []  IV    Prescribed CHF Medications? []  Yes  []  No            Compliant with CHF Meds?      []  Yes []   No  []  N/A     Has Scale at Home?        []  Yes   []  No Weighs Daily? []  Yes   []  No                Knows Baselines (normal weight, B/P, HR, amount of activity tolerated before getting tired, etc.)? []  Yes   []  No      Knows signs/symptoms of worsening CHF (wt gain, SOB, swelling, fatigue, etc.)? []  Yes  []  No      Sleeps with propped up pillows? []  Yes  []  No     If yes, how many? Short Term Goals (to be achieved in 4 weeks):  []  Identification of individual Baselines  (see left)  []  Understanding of & able to act upon signs/symptoms of worsening CHF   []  Monitors weight daily at home  []  Monitors BP at home  []  Compliant with medications  []  Compliant w/ diet and sodium intake  []  Monitors home fluid intake  []  Exercises daily   []  Other:      Interventions    []  Instruct patient in CHF Self-Management Care     []  Provides CHF educational class(es)    [] Other:    Education Topics (see educational log)    []  Living with CHF      LTG (to be achieved by discharge)  [] Able to verbalize individual baselines, signs and symptoms of worsening heart failure and appropriate self-management  [] Compliant with medications  [] Compliant with sodium intake and fluid intake. []Weighs self daily  [] Monitors blood pressure at home   *Please see recent flow sheets   Newly diagnosed:  []  Yes  []  No  [] N/A  If yes, describe date of onset, class and medication changes:    STG Status/UPOC:  Per patient report:   Identifies own individual Baselines  (see left):  []  Yes  []  No, continue      Understands and able to act upon symptoms of worsening CHF:   []  Yes   []  No, continue    Weighs self daily:   [] Yes   []  No, continue      Monitors BP at home:   [] Yes   []  No, continue      Compliant with medications:   [] Yes   []  No, continue      Compliant w/ diet and sodium intake:   [] Yes   []  No, continue      Monitors home fluid intake:   [] Yes   []  No, continue      Exercises daily:  [] Yes   []  No, continue      [] Other:   [] Yes   []  No, continue                                         LTG (to be achieved by discharge)  [] Able to verbalize individual baselines, signs and symptoms of worsening heart failure and appropriate self-management  [] Compliant with medications  [] Compliant with sodium intake and fluid intake. []Weighs self daily  [] Monitors blood pressure at home   *Please see recent flow sheets  Newly diagnosed:  []  Yes  []  No  [] N/A  If yes, describe date of onset, class and medication changes:      STG Status/UPOC:  Per patient report: Identifies own individual Baselines  (see left):  []  Yes  []  No, continue      Understands and able to act upon symptoms of worsening CHF:   []  Yes   []  No, continue      Weighs self daily:   [] Yes   []  No, continue      Monitors BP at home:   [] Yes   []  No, continue      Compliant with medications:   [] Yes   []  No, continue      Compliant w/ diet and sodium intake:   [] Yes   []  No, continue      Monitors home fluid intake:   [] Yes   []  No, continue      Exercises daily:  [] Yes   []  No, continue      [] Other:   [] Yes   []  No, continue     LTG (to be achieved by discharge)  [] Able to verbalize individual baselines, signs and symptoms of worsening heart failure and appropriate self-management  [] Compliant with medications  [] Compliant with sodium intake and fluid intake. []Weighs self daily  [] Monitors blood pressure at home        Core Measures- Hypertention  Initial Assessment    Angie Garcia RN  5/30/2017,10:27 AM Initial Plan     Goals/Interventions/Education 30-Day Re-Assessment/POC    Date/Time:    Initials: 60-Day Re-Assessment/POC    Date/Time:    Initials:      []  Not a Risk Factor at This Time. No plan of care required.      [x]  Hypertension Risk Factor    [x]  On Blood Pressure Meds  [x]  Medication Regimen Compliance    [x]  Yes  []  No   [x]  Monitors BP at Home          [x]  Yes   []  No  []  No B/P monitoring system at home  []  If No B/P monitoring system at Home, Encouraged to Purchase  [] Target BP: (if different from standing order)       *Resting blood pressure obtained prior to six-minute walk test. Short Term Goals (to be achieved in 4 weeks):  [x]  BP Medication compliance  [x]  Low Na+ diet  [x]  Monitors BP at home  [] Resting BP :     [] Other:   Interventions  [x]  Monitor BP before and after exercise  Education Provided (see education log):  [x]  Living with Hypertension  LTG (to be achieved by discharge)  [] Consistent resting blood pressure <      [x] Monitors BP at home  [x] BP medication compliance. *Please see recent flow sheets    Newly diagnosed:  []  Yes  []  No  [] N/A   If yes, describe date of onset and medication changes:    STG Status/UPOC:  Per patient report    BP Medication compliance:  [] Yes   []  No, continue    Low Na+ diet:  [] Yes   []  No, continue    Monitors BP at home:  [] Yes   []  No, continue    Resting BP (obtained by clinician):  [] Yes   []  No, continue    Other:  *Please see recent flow sheets    Newly diagnosed:  []  Yes  []  No  [] N/A   If yes, describe date of onset and medication changes    STG Status/UPOC:  Per patient report    BP Medication compliance:   [] Yes   []  No, continue    Low Na+ diet:  [] Yes   []  No, continue    Monitors BP at home:  [] Yes   []  No, continue    Resting BP (obtained by clinician):  [] Yes   []  No, continue    Other:        LTG (to be achieved by discharge)  [] Consistent resting blood pressure <      [] Monitors BP at home  [] BP medication compliance. LTG (to be achieved by discharge)  [] Consistent resting blood pressure <      [] Monitors BP at home  [] BP medication compliance. .     Core Measures Diabetes    Initial Assessment    Gretel Piña RN  5/30/2017,10:27 AM Initial Plan     Goals/Interventions/Education 30-Day Re-Assessment/POC    Date/Time:    Initials:  60-Day Re-Assessment/POC    Date/Time:    Initials:    [x]  Not a Risk Factor at This Time. No plan of care required. []  Diabetes Risk Factor    []  Type I  []  Type II      []  Insulin Dependent  []  Insulin Pump? []  Oral Hyperglycemic            Medications  []  Diet Controlled  []  Newly Diagnosed    Patient Owns a Glucometer             [] Yes   []  No      [] Monitors BS @ home              [] Yes   []  No  If yes, frequency/day:    BS Range at home:     How Long a Diabetic? Pt compliant With Meds                   []  Yes     []  No      Pt Compliant With Diabetic Diet                 []  Yes       []  No      HbA1c    (Desired <7%)    Date:     [] HbA1c Unavailable Short Term Goals (to be achieved in 4 weeks):  [] Compliant with diabetic medication  [] Compliant with diabetic diet  [] Monitors blood sugar at home  [] Takes appropriate corrective actions when blood glucose is out of range     [] Other:   Interventions  []  Review patient's reported blood sugar pre and post exercise, as needed based on signs/symptoms.   Education Provided (see education log):  [] Living with Diabetes     LTG (to be achieved by discharge)   [] Blood sugar <     And >      For exercise   []  Patient is diabetic medication compliant   [] Patient controls blood sugar via diet   [] Blood sugar <    And >    For fasting blood sugar       *Please see recent flow sheets    Newly diagnosed:  []  Yes  []  No  [] N/A   If yes, describe date of onset and medication changes:    STG Status/UPOC:  Per patient report    Compliant with diabetic medication:   [] Yes   []  No, continue    Compliant with diabetic diet:  [] Yes   []  No, continue    Monitors blood sugar at home:  [] Yes   []  No, continue    Takes appropriate corrective actions when blood glucose is out of range:  [] Yes   []  No, continue    Other:       LTG (to be achieved by discharge)   [] Blood sugar <     And >      For exercise   []  Patient is diabetic medication compliant   [] Patient controls blood sugar via diet   [] Blood sugar <    And >    For fasting blood sugar *Please see recent flow sheets    Newly diagnosed:  []  Yes  []  No  [] N/A   If yes, describe date of onset and medication changes:    STG Status/UPOC:  Per patient report    Compliant with diabetic medication:  [] Yes   []  No, continue      Compliant with diabetic diet:  [] Yes   []  No, continue    Monitors blood sugar at home:  [] Yes   []  No, continue    Takes appropriate corrective actions when blood glucose is out of range:  [] Yes   []  No, continue    Other:           LTG (to be achieved by discharge)   [] Blood sugar <     And >      For exercise   []  Patient is diabetic medication compliant   [] Patient controls blood sugar via diet   [] Blood sugar <    And >    For fasting blood sugar             Initial Assessment Clinician Notes:  Patient Goal(s): to increase endurance, less sob. Ozzie Pickens was an active participant in the creation of this ITP and is agreeable to treatment within an open gym environment. Supervising Physician: DR. Rosa Napier RN,   5/30/2017,10:27 AM  Medical Director ITP and Exercise Prescription Approval  Cardiac Rehabilitation    Initial Evaluation and Exercise Prescription:  The Medical Directors electronic co-signature validates that the provider has reviewed the above findings with any changes and/or additional comments listed below. The electronic signature certifies the need for Cardiac Rehabilitation furnished under this plan of treatment and while under the providers care. The co- signature certifies agreement with the plan and certifies that this therapy is necessary at this time. Certification Period: 5/30/17-6/30/17      30-Day Reassessment and Physician 30-Day Visit:  The medical Directors electronic co-signature validates that the provider has reviewed the above findings with any changes and/or additional comments listed below.  The electronic signature certifies the need for Cardiac Rehabilitation furnished under this plan of treatment and while under the providers care. The co-signature certifies agreement with the plan and certifies that this therapy is necessary at this time. Patient's functional level; progress towards goals; carry-over learning within the home; problems, concerns and/or deficits; treatment plan (treatment modalities); and patient goals were reviewed with the patient. Certification Period:      60-Day Reassessment and Physician 60-Day Visit:  The Medical Directors electronic co-signature validates that the provdier has reviewed the above findings with any changes and/or additional comments listed below. The electronic signature certifies the need for Cardiac Rehabilitation furnished under this plan of treatment and while under the providers care. The co-signature certifies agreement with the plan and certifies that this therapy is necessary at this time. Patient's functional level; progress towards goals; carry-over learning within the home; problems, concerns and/or deficits; treatment plan (treatment modalities); and patient goals were reviewed with the patient. Certification Period:     Cardiac Rehabilitation Discharge Assessment and Long-Term Goals    Exercise (Required):  [] LTG. 1: Aerobic Exercise >     Minutes weekly, with Met levels >        , resistance                  training 2-3 days/week. - LTG 1. Status: [] Met  []Not Met Comments:    [] LTG 2: Increase 6MW test by      Feet (Initial 6-MW Test feet:        ,Discharge 6-MW Test Distance:         )     - LTG 2. Status: [] Met  []Not Met Comments:      Discharge Plan:    []Maintenance Program    []HEP     []Other:     Nutrition (Required):  [] LTG. 1:  Patient able to verbalize an understanding of life-long adherence to a healthy                   diet and the impact on health conditions.       Comments:    -LTG Status: [] Met  []Not Met Comments:       Initial RYP score:    Discharge RYP score:  Discharge Plan: []Maintain dietary adherence     []Other:    Psychosocial (Required):  [] LTG. 1:  Use relaxation techniques when stressors are identified and verbalize coping skills and decrease vulnerability to stress.    - LTG 1. Status: [] Met []Not Met Comments:  [] LTG. 2: Maintain decreased smoking behaviors/smoking cessation      - LTG 2. Status: [] Met []Not Met Comments:  [] LTG. 3: Improve PHQ-9 score (Initial PHQ-9 score:      , Discharge PHQ-9 score       )      - LTG 3. Status: [] Met []Not Met Comments:   [] LTG. 4: Improve [employer] scores and maintain a healthy psychosocial well-being     - LTG 4. Status: [] Met []Not Met Comments:        Discharge Plan: []Maintain healthy psychosocial well-being     []Other:    *See [facility] Quality of Life tool for pre and post-treatment screenings. Oxygen (Core Measure):  [] LTG. 1: Patient will independently preform pursed-lip breathing (initial 6MW test                  (during) Sats:           DC 6MW test (during) Sats:     - LTG 1. Status: [] Met []Not Met Comments:       [] LTG. 2: Patient will independently perform diaphragmatic breathing      - LTG 2. Status: [] Met []Not Met Comments:    [] LTG. 3: Patient will follow Oxygen prescription     - LTG 3. Status: [] Met []Not Met Comments:    Discharge Plan: []Maintain healthy oxygen saturations     []Other:       CHF (Prevention of Exacerbation):  []Risk Factor []Not a Risk Factor at This Time  [] LTG. 1: Able to verbalize individual baselines, signs and symptoms of worsening                  heart failure and appropriate self-mangement. -LTG Status1: [] Met []Not Met Comments:  [] LTG. 2: Compliant with medications     -LTG Status 2: [] Met []Not Met Comments:  [] LTG.  3: Compliant with Sodium intake and fluid intake     -LTG Status 3: [] Met []Not Met Comments:  [] LTG. 4: Weighs self daily     -LTG Status 4: [] Met []Not Met Comments:    [] LTG. 5: Monitors blood pressure at home     -LTG Status 5: [] Met []Not Met Comments:       Discharge Plan:  []Maintain CHF exacerbation prevention     []Other:      Hypertension: []Risk Factor []Not a Risk Factor at This Time  [] LTG. 1: Consistent resting blood pressure <    Initial Resting BP:   DC Resting BP:                 -LTG Status 1: [] Met []Not Met Comments:    [] LTG. 2: Monitors Blood Pressure at home     -LTG Status 2: [] Met []Not Met Comments:     [] LTG. 3: Blood Pressure medication compliance        -LTG Status 3: [] Met []Not Met Comments:    Discharge Plan: []Maintain healthy blood pressure   []Other:     Diabetes:    [] Risk Factor      [] Not a Risk Factor at This Time  [] LTG. 1: Blood Sugar <       And >         For exercise     -LTG Status 1: [] Met []Not Met Comments:    [] LTG. 2: Patient is medication compliant      -LTG Status 2: [] Met []Not Met Comments:    [] LTG. 3: Patient controls blood sugar via diet     -LTG Status 3: [] Met []Not Met Comments:     [] LTG. 4: Blood Sugar <        And >       For fasting blood sugar     -LTG Status 4: [] Met []Not Met Comments:    Initial Fasting Blood Glucose:          Discharge Fasting Blood Glucose:     Discharge Plan: []Maintain healthy glucose levels     []Other:      Clinicians Name:  Date/Time        I have reviewed the above findings and concur with these findings with any changes and/or additional comments noted below.   I certify I have conducted the outcome assessment (above) based on patient-centered outcomes (including the [facility]) which includes objective clinical measurements of the effectiveness of the cardiac rehabilitation program for this individual.

## 2017-06-06 NOTE — PROGRESS NOTES
1. Have you been to the ER, urgent care clinic since your last visit? Hospitalized since your last visit? Yes 5/17/17-5/19/17  Unstable angina     2. Have you seen or consulted any other health care providers outside of the [address] [place] [address] since your last visit? Include any pap smears or colon screening.  No

## 2017-06-06 NOTE — MR AVS SNAPSHOT
Visit Information Date & Time Provider Department Dept. Phone Encounter #  
 6/6/2017 10:00 AM Filiberto Lim MD [facility] [address] [id number] Upcoming Health Maintenance Date Due DTaP/Tdap/Td series (1 - Tdap) 4/30/1956 ZOSTER VACCINE AGE 60> 4/30/1995 GLAUCOMA SCREENING Q2Y 4/30/2000 OSTEOPOROSIS SCREENING (DEXA) 4/30/2000 Pneumococcal 65+ High/Highest Risk (1 of 2 - PCV13) 4/30/2000 MEDICARE YEARLY EXAM 4/30/2000 INFLUENZA AGE 9 TO ADULT 8/1/2017 Allergies as of 6/6/2017  Review Complete On: 6/6/2017 By: Filiberto Lim MD  
  
 Severity Noted Reaction Type Reactions Nexium [Esomeprazole Magnesium]  09/24/2014    Hives, Rash Protonix [Pantoprazole]  09/24/2014    Hives, Rash Current Immunizations  Never Reviewed No immunizations on file. Not reviewed this visit You Were Diagnosed With   
  
 Codes Comments Unstable angina (HCC)    -  Primary ICD-10-CM: I20.0 ICD-9-CM: 411.1 Ischemic cardiomyopathy     ICD-10-CM: I25.5 ICD-9-CM: 414.8 Vitals BP Pulse Height(growth percentile) Weight(growth percentile) SpO2 BMI  
 108/68 (!) 58 5' 2\" (1.575 m) 144 lb (65.3 kg) 98% 26.34 kg/m2 OB Status Smoking Status Postmenopausal Never Smoker Vitals History BMI and BSA Data Body Mass Index Body Surface Area  
 26.34 kg/m 2 1.69 m 2 Preferred Pharmacy Pharmacy Name Phone [address] [place] [address], [address] [place] [address] [phone] Your Updated Medication List  
  
   
This list is accurate as of: 6/6/17 10:40 AM.  Always use your most recent med list.  
  
  
  
  
 aspirin delayed-release 81 mg tablet Take 81 mg by mouth daily. CENTRUM SILVER PO Take  by mouth. clopidogrel 75 mg Tab Commonly known as:  PLAVIX Take 1 Tab by mouth daily. garlic 9,266 mg Cap Take 1 Cap by mouth daily. isradipine 5 mg capsule Commonly known as:  [facility] Take 5 mg by mouth daily. Indications: Hypertension  
  
 levothyroxine 25 mcg tablet Commonly known as:  SYNTHROID Take  by mouth Daily (before breakfast). loratadine 10 mg Cap Take  by mouth.  
  
 metoprolol tartrate 50 mg tablet Commonly known as:  LOPRESSOR Take 1 Tab by mouth every twelve (12) hours. MIRALAX 17 gram/dose powder Generic drug:  polyethylene glycol Take 17 g by mouth daily. nitroglycerin 0.4 mg SL tablet Commonly known as:  NITROSTAT  
1 Tab by SubLINGual route every five (5) minutes as needed for Chest Pain. raNITIdine hcl 150 mg capsule Take 300 mg by mouth two (2) times a day. simvastatin 20 mg tablet Commonly known as:  ZOCOR Take 1 Tab by mouth nightly. torsemide 10 mg tablet Commonly known as:  DEMADEX Take 10 mg by mouth daily. traMADol 50 mg tablet Commonly known as:  ULTRAM  
Take 50 mg by mouth every six (6) hours as needed for Pain. We Performed the Following AMB POC EKG ROUTINE W/ 12 LEADS, INTER & REP [46924 CPT(R)] To-Do List   
 06/06/2017 Lab:  MAGNESIUM   
  
 06/06/2017   Lab:  METABOLIC PANEL, BASIC   
  
 06/07/2017 12:30 PM  
  Appointment with [facility] REHAB/[facility]LLNESS at [place] [address] ([phone])  
  
 06/12/2017 12:30 PM  
  Appointment with [facility] REHAB/[facility]LLNESS at [place] [address] ([phone])  
  
 06/14/2017 12:30 PM  
  Appointment with [facility] REH[facility]LL[facility] at [place] [address] ([phone])  
  
 06/19/2017 12:30 PM  
  Appointment with [facility] REHAB/[facility]LL[facility] at [place] [address] ([phone])  
  
 06/21/2017 12:30 PM  
  Appointment with [facility] REHAB/[facility]LLNESS at [place] [address] ([phone])  
  
 06/26/2017 12:30 PM  
  Appointment with [facility] REHAB/WELLNESS at [place] [address] ([phone])  
  
 06/28/2017 12:30 PM  
 Appointment with [facility] REH[facility]WELLNESS at [place] [address] ([phone])  
  
 07/03/2017 12:30 PM  
  Appointment with [facility] REH[facility]/WELLNESS at [place] [address] ([phone])  
  
 07/05/2017 12:30 PM  
  Appointment with [facility] RE[facility] at [place] [address] ([phone])  
  
 07/10/2017 12:30 PM  
  Appointment with [facility] at [place] [address] ([phone])  
  
 07/12/2017 12:30 PM  
  Appointment with [facility] at [place] [address] ([phone])  
  
 07/17/2017 12:30 PM  
  Appointment with [facility] at [place] [address] ([phone])  
  
 07/19/2017 12:30 PM  
  Appointment with [facility] at [place] [address] ([phone])  
  
 07/24/2017 12:30 PM  
  Appointment with [facility] at [place] [address] ([phone])  
  
 07/26/2017 12:30 PM  
  Appointment with [facility] at [place] [address] ([phone])  
  
 07/31/2017 12:30 PM  
  Appointment with [facility] at [place] [address] ([phone])  
  
 08/02/2017 12:30 PM  
  Appointment with [facility] at [place] [address] ([phone])  
  
 08/07/2017 12:30 PM  
  Appointment with [facility] at [place] [address] ([phone])  
  
 08/09/2017 12:30 PM  
  Appointment with [facility] at [place] [address] ([phone])  
  
 08/14/2017 12:30 PM  
  Appointment with [facility] at [place] [address] ([phone])  
  
 08/16/2017 12:30 PM  
  Appointment with [facility] REHAB/WELLNESS at [place] [address] ([phone])  
  
 08/21/2017 12:30 PM  
  Appointment with [facility] REHAB/WELLNESS at [place] [address] ([phone])  
  
 08/23/2017 12:30 PM  
  Appointment with [facility] REHAB/WELLNESS at [place] [address] ([phone])  
  
 08/28/2017 12:30 PM  
  Appointment with [facility] REHAB/WELLNESS at [place] [address] ([phone])  
  
 08/30/2017 12:30 PM  
  Appointment with [facility] REHAB/WELLNESS at [place] [address] ([phone])  
  
 09/06/2017 12:30 PM  
  Appointment with [facility] REHAB/WELLNESS at [place] [address] ([phone])  
  
 09/11/2017 12:30 PM  
  Appointment with [facility] REHAB/WELLNESS at [place] [address] ([phone])  
  
 09/13/2017 12:30 PM  
  Appointment with [facility] REHAB/WELLNESS at [place] [address] ([phone]) Patient Instructions Lasix 10 mg daily at 3 pm 
 
 
  
Introducing [facility]! Kizzy Villagomez introduces [employer] patient portal. Now you can access parts of your medical record, email your doctor's office, and request medication refills online. 1. In your internet browser, go to [email or web address] 2. Click on the First Time User? Click Here link in the Sign In box. You will see the New Member Sign Up page. 3. Enter your [employer] Access Code exactly as it appears below. You will not need to use this code after youve completed the sign-up process. If you do not sign up before the expiration date, you must request a new code. · [employer] Access Code: [email or web address] Expires: 7/19/2017  1:34 PM 
 
4. Enter the last four digits of your Social Security Number (xxxx) and Date of Birth (mm/dd/yyyy) as indicated and click Submit. You will be taken to the next sign-up page. 5. Create a [employer] ID. This will be your [employer] login ID and cannot be changed, so think of one that is secure and easy to remember. 6. Create a [employer] password. You can change your password at any time. 7. Enter your Password Reset Question and Answer. This can be used at a later time if you forget your password. 8. Enter your e-mail address. You will receive e-mail notification when new information is available in [address] 9. Click Sign Up. You can now view and download portions of your medical record. 10. Click the Download Summary menu link to download a portable copy of your medical information. If you have questions, please visit the Frequently Asked Questions section of the [employer] website. Remember, [employer] is NOT to be used for urgent needs. For medical emergencies, dial 911. Now available from your iPhone and Android! Please provide this summary of care documentation to your next provider. Your primary care clinician is listed as Dandre Mueller. If you have any questions after today's visit, please call [phone].

## 2017-06-06 NOTE — PROGRESS NOTES
HISTORY OF PRESENT ILLNESS  Bela Minaya is a 80 y.o. female. ASSESSMENT and PLAN    Ms. Anisa Sutton has history of CAD. In March 2017, she developed noticeable exertional dyspnea and occasional chest tightness. She underwent coronary evaluation and was noted to have severe CAD. Her nuclear scan revealed dense infarction involving the entire inferior, inferolateral, and inferior septum walls. She had medium size area of infarction with partial reversibility in distal anterior wall and apex. Her EF was noted to be 38%. She underwent coronary angiography in May 2017, which revealed severe three-vessel CAD. RCA was noted to be 100% occluded with distal collaterals; LAD had heavily calcified, tortuous, proximal and mid 95% stenosis. She had angioplasty of the proximal and mid LAD with improvement in flow. However, stent could not be deployed due to the calcification and tortuosity.  CAD:   Symptomatically stable. She continues to have exertional dyspnea with mild improvement.  BP:   Well-controlled.  HR:    Sinus bradycardia, asymptomatic.  CHF:   She has noted orthopnea and PND. This has improved since being started on diuretics. This will be continued. Instead of torsemide, she will be started on furosemide 10-20 mg daily at about 3 PM.  She will have her SMA-7 and magnesium checked today and follow. She may need potassium replacement.  Weight:   Her weight today is 144 pounds. This is near her baseline.  Cholesterol:   Target LDL <70. She continues on Zocor 20 mg daily.  Anti-platelet:   Remains on ASA, and Plavix. I will see her back in 3-4 months. Encounter Diagnoses   Name Primary?     Unstable angina (HCC) Yes    Ischemic cardiomyopathy      current treatment plan is effective, no change in therapy  lab results and schedule of future lab studies reviewed with patient  reviewed diet, exercise and weight control  cardiovascular risk and specific lipid/LDL goals reviewed  use of aspirin to prevent MI and TIA's discussed      HPI   Today, Ms. Maryanne Ochoa has no complaints of chest pains or exertional chest pains. She continues to have exertional dyspnea. However, subjectively, she feels that this has improved somewhat. She denies any shortness of breath at rest.  However, she does have orthopnea and PND. This improved after being started on diuretics by her PCP. Her ankle swelling has also improved. Review of Systems   Respiratory: Positive for shortness of breath. Cardiovascular: Positive for orthopnea, leg swelling and PND. Negative for chest pain, palpitations and claudication. All other systems reviewed and are negative. Physical Exam   Constitutional: She is oriented to person, place, and time. She appears well-developed and well-nourished. HENT:   Head: Normocephalic. Eyes: Conjunctivae are normal.   Neck: Neck supple. No JVD present. Carotid bruit is not present. No thyromegaly present. Cardiovascular: Regular rhythm. Bradycardia present. Pulmonary/Chest: Breath sounds normal.   Abdominal: Bowel sounds are normal.   Musculoskeletal: She exhibits no edema. Neurological: She is alert and oriented to person, place, and time. Skin: Skin is warm and dry. Nursing note and vitals reviewed. PCP: Joy Pizano MD    Past Medical History:   Diagnosis Date    Breast cancer [facility])     denies    Cardiac catheterization 05/17/2017    R dom. LM patent. p/mLAD tortuous, serial 95% (s/p POBA w/VERNA-3 flow). Ds patent. mCx 35%. OMB patent. mRCA 100% (chronic) w/distal collats. LVEDP 7 mmHg. EF 40%. Inferobasal akinesis.  Cardiac echocardiogram 05/12/2017    EF 40-50%. Severe basal-mid inferoseptal, inferior, basal-mid inferolateral hypk. Gr 1 DDfx. Mild-mod MR. Mild TR.  Cardiac nuclear imaging test, abnormal 05/12/2017    High risk. Lg area of dense inferior, inferolateral & inferoseptal infarction.   Mod area of distal anterior, apical infarction w/possible ischemia. Inferior & inferolateral akin. LV apical & distal anterior hypk. EF 38%. Nondiagnostic EKG on pharm stress test.    Hypertension     Ovarian cancer ([address]     Thyroid dysfunction        Past Surgical History:   Procedure Laterality Date    CARDIAC CATHETERIZATION  5/17/2017         CORONARY ARTERY ANGIOGRAM  5/17/2017         HX ENDOSCOPY  01/03/2017    Dr. Carmen Cramer HX OTHER SURGICAL  2011    Ovaries removed    LV ANGIOGRAPHY  5/17/2017         MODERATE SEDATION  5/17/2017         PTCA W CONFIRM CATH SINGLE VES  5/17/2017            Current Outpatient Prescriptions   Medication Sig Dispense Refill    torsemide (DEMADEX) 10 mg tablet Take 10 mg by mouth daily.  clopidogrel (PLAVIX) 75 mg tab Take 1 Tab by mouth daily. 30 Tab 11    metoprolol tartrate (LOPRESSOR) 50 mg tablet Take 1 Tab by mouth every twelve (12) hours. 60 Tab 11    nitroglycerin (NITROSTAT) 0.4 mg SL tablet 1 Tab by SubLINGual route every five (5) minutes as needed for Chest Pain. 1 Bottle 2    simvastatin (ZOCOR) 20 mg tablet Take 1 Tab by mouth nightly. 30 Tab 11    aspirin delayed-release 81 mg tablet Take 81 mg by mouth daily.  garlic 5,186 mg cap Take 1 Cap by mouth daily.  polyethylene glycol (MIRALAX) 17 gram/dose powder Take 17 g by mouth daily.  traMADol (ULTRAM) 50 mg tablet Take 50 mg by mouth every six (6) hours as needed for Pain.  raNITIdine hcl 150 mg capsule Take 300 mg by mouth two (2) times a day. (Patient taking differently: Take 150 mg by mouth two (2) times a day.) 60 Cap 12    isradipine (DYNACIRC) 5 mg capsule Take 5 mg by mouth daily. Indications: Hypertension      levothyroxine (SYNTHROID) 25 mcg tablet Take  by mouth Daily (before breakfast).  loratadine 10 mg cap Take  by mouth.  FOLIC ACID/MULTIVITS-MIN/LUT (CENTRUM SILVER PO) Take  by mouth.          The patient has a family history of    Social History   Substance Use Topics    Smoking status: Never Smoker    Smokeless tobacco: Never Used    Alcohol use Yes      Comment: Occasionally       Lab Results   Component Value Date/Time    Cholesterol, total 198 05/18/2017 05:23 AM    HDL Cholesterol 58 05/18/2017 05:23 AM    LDL, calculated 127.4 05/18/2017 05:23 AM    Triglyceride 63 05/18/2017 05:23 AM    CHOL/HDL Ratio 3.4 05/18/2017 05:23 AM        BP Readings from Last 3 Encounters:   06/06/17 108/68   05/19/17 100/64   05/10/17 130/64        Pulse Readings from Last 3 Encounters:   06/06/17 (!) 58   05/19/17 70   05/10/17 81       Wt Readings from Last 3 Encounters:   06/06/17 65.3 kg (144 lb)   05/19/17 69.9 kg (154 lb 3.2 oz)   05/10/17 65.8 kg (145 lb)         EKG: unchanged from previous tracings, sinus bradycardia, Q waves in 3, aVF, V1 through V3 with lateral ST depression T-wave inversion.

## 2017-06-07 NOTE — PROGRESS NOTES
Per your last note \" CHF:   She has noted orthopnea and PND. This has improved since being started on diuretics. This will be continued. Instead of torsemide, she will be started on furosemide 10-20 mg daily at about 3 PM.  She will have her SMA-7 and magnesium checked today and follow. She may need potassium replacement.

## 2017-06-07 NOTE — PROGRESS NOTES
CARDIAC REHAB FLOWSHEET Service Date:6/7/17 Session #:4 Time In:1230 Time Out:1330 30-day Re-Assessment Date:   Patients carry-over of treatment evident by:  NONE YET   OBJECTIVE Objective Comments:   Skin [x] - Warm    [] - Cool     [] - Dry   [] - Moist    [] - Dusky   [] - Pale   [] - Cyanosis   Dependent Edema: [x] - None   OR Location:   Blood Glucose: [x]- NA      Fasting:   Pre-Exercise:  Post-Exercise:   Medications taken as prescribed:   [x] - Yes              [] - No Medication Comments:   Pre SpO2:  97     Pre HR:66 Pre BP:108/71 Chest Pain:0 Weight:142 Other Pain (1-10) and Location:0   Post SpO2:  97       Post HR:70 Post BP:112/69 Chest Pain:0 Other Pain (1-10) and Location:0   ECG interpretation at rest:  NSR ECG interpretation post-exercise:          NSR       Self-Care/ Education   []-CHF/Heart Disease         []- Heart Attack Warning Signs        []- Exercise Safety        []- Risk Factors for CAD        []- Medications   [x]- Breathing Techniques    []- Collaborative Self-Mgt                 []- Hypertension            []-Adequate Sleep                []-  Nutrition                                              []-Cholesterol and Lipids    []- Stress Mgt/Emotional Health      []-Diabetes Mellitus       []-Intimacy Concerns with Heart Disease         []-Travel/Environment         []-Returning pre-cardiac activities   []-Home Exercise     []-Other:     Patient Response []- NA [x]- Patient actively participated in education    []- Patient disengaged during education     [] - Able to recite main objectives    [] - Unable to recite main objectives [] - Able to demonstrate     [] - Unable to demonstrate   Educational comments:    EXERCISE PRESCRIPTION:        MODALITY   Time   (min) Speed/Level During Exercise Post Exercise      HR RPD   1-4 RPE  6-20 Pain  1-10 HR RPD   1-4 RPE  6-20 Pain  1-10   stretching/monitoring/deep breathing 15  63 1 6 1 64 1 6 1   TM 20 1.2 93 2 14 1 71 1 6 1   ERGOMETER 25 10W 69 1 13 1 59 1 6 1                                Session #5 Time in:1330 Time out:1405   MODALITY   Time   (min) Speed/Level During Exercise Post Exercise      HR RPD   1-4 RPE   6 - 20 Pain  1 - 10 HR RPD   1-4 RPE   6 - 20 Pain   1 - 10   stretching/monitoring/deep breathing 10  69 1 6 1 68 1 6 1   STEPPER 25 L2 72 1 12 1 64 1 6 1                                          Patient Response to Exercise Prescription   []- ? Dyspnea                        [] - ? Fatigue                              [] - Abnormal responses:  Explain         [] - ? Pain (Describe location, description of pain, action(s) taken:         [] - Experienced no problems; no cuing required            [x]- Good effort               []- Fair effort                   []- Poor   effort        [x]- Good motivation         []- Fair motivation                 []- Poor motivation         [] - Appropriate physiological responses                             Exercise Comments:    ASSESSMENT   []- Patient unable to  progress towards  LTGs due to:  []- Illness       []- Weakness/debility       []- Poor effort       []- Poor Motivation       []- Poor Attendance        [x]- Patient progressing towards LTGs as evident by:    [] Decreased dyspnea on exertion                 [] Decreased fatigue                  [] Improved strength                [] Improved aerobic capacity & endurance                [] Decreased pain               [x] Increased pacing          [] Improved emotional health          [] Cigarette reduction / Smoking cessation participation            [] Improved self-monitoring at home          [] Improved nutritional awareness/monitoring          [] Other (Describe): Additional Comments:    PLAN OF CARE [x] - Continue as written   OR    [] - Adjust treatment plan as follows:           Physician supervision provided this date of service by:      [employer] [id number]   BILLING Code 06863 (non-monitored):     Total minutes:    Code 95870 (monitored):             Total minutes:   *ECG rhythm attached      [] 1 CR session only   OR     []  2 CR sessions       [] 1 CR session only     OR     [x]  2 CR sessions       *EKG Strip:    Silas Shankar RN  6/7/2017, 1:44 PM

## 2017-06-12 NOTE — PROGRESS NOTES
CARDIAC REHAB FLOWSHEET Service Date:6/12/17 Session #:6 Time In:1230 Time Out:1330 30-day Re-Assessment Date:   Patients carry-over of treatment evident by:  INCREASED STRENGTH   OBJECTIVE Objective Comments:   Skin [x] - Warm    [] - Cool     [] - Dry   [] - Moist    [] - Dusky   [] - Pale   [] - Cyanosis   Dependent Edema: [x] - None   OR Location:   Blood Glucose: [x]- NA      Fasting:   Pre-Exercise:  Post-Exercise:   Medications taken as prescribed:   [x] - Yes              [] - No Medication Comments:   Pre SpO2:    100   Pre HR:82 Pre BP:120/75 Chest Pain:0 Weight:142 Other Pain (1-10) and Location:0   Post SpO2:  95       Post HR:70 Post BP:112/68 Chest Pain:0 Other Pain (1-10) and Location:0   ECG interpretation at rest:  NSR ECG interpretation post-exercise:        NSR         Self-Care/ Education   []-CHF/Heart Disease         []- Heart Attack Warning Signs        []- Exercise Safety        []- Risk Factors for CAD        []- Medications   []- Breathing Techniques    []- Collaborative Self-Mgt                 []- Hypertension            []-Adequate Sleep                []-  Nutrition                                              []-Cholesterol and Lipids    []- Stress Mgt/Emotional Health      []-Diabetes Mellitus       []-Intimacy Concerns with Heart Disease         []-Travel/Environment         []-Returning pre-cardiac activities   [x]-Home Exercise     []-Other:     Patient Response []- NA [x]- Patient actively participated in education    []- Patient disengaged during education     [] - Able to recite main objectives    [] - Unable to recite main objectives [] - Able to demonstrate     [] - Unable to demonstrate   Educational comments:    EXERCISE PRESCRIPTION:        MODALITY   Time   (min) Speed/Level During Exercise Post Exercise      HR RPD   1-4 RPE  6-20 Pain  1-10 HR RPD   1-4 RPE  6-20 Pain  1-10   stretching/monitoring/deep breathing 20  80 1 6 1 82 1 6 1   TM 20 1.3 99 2 13 1 80 1 6 1 ERGOMETER 20 10W 81 1 12 1 75 1 6 1                                Session #7 Time in:1330 Time out:1405   MODALITY   Time   (min) Speed/Level During Exercise Post Exercise      HR RPD   1-4 RPE   6 - 20 Pain  1 - 10 HR RPD   1-4 RPE   6 - 20 Pain   1 - 10   stretching/monitoring/deep breathing 15  71 1 6 1 72 1 6 1   STEPPER 20 L2 81 1 12 1 73 1 6 1                                          Patient Response to Exercise Prescription   []- ? Dyspnea                        [] - ? Fatigue                              [] - Abnormal responses:  Explain         [] - ? Pain (Describe location, description of pain, action(s) taken:         [] - Experienced no problems; no cuing required            [x]- Good effort               []- Fair effort                   []- Poor   effort        [x]- Good motivation         []- Fair motivation                 []- Poor motivation         [] - Appropriate physiological responses                             Exercise Comments:    ASSESSMENT   []- Patient unable to  progress towards  LTGs due to:  []- Illness       []- Weakness/debility       []- Poor effort       []- Poor Motivation       []- Poor Attendance        [x]- Patient progressing towards LTGs as evident by:    [] Decreased dyspnea on exertion                 [] Decreased fatigue                  [] Improved strength                [] Improved aerobic capacity & endurance                [] Decreased pain               [x] Increased pacing          [] Improved emotional health          [] Cigarette reduction / Smoking cessation participation            [] Improved self-monitoring at home          [] Improved nutritional awareness/monitoring          [] Other (Describe): Additional Comments:    PLAN OF CARE [x] - Continue as written   OR    [] - Adjust treatment plan as follows:           Physician supervision provided this date of service by:      [employer] [id number]   BILLING Code 61934 (non-monitored):     Total minutes:    Code 10579 (monitored):             Total minutes:   *ECG rhythm attached      [] 1 CR session only   OR     []  2 CR sessions       [] 1 CR session only     OR     [x]  2 CR sessions       *EKG Strip:    Margie Cruz RN  6/12/2017, 1:35 PM

## 2017-06-12 NOTE — TELEPHONE ENCOUNTER
Pt called stating that she is doing fine with taking 1/2 tablet of Furosemide 20mg tablet. Pt states that she's experience only a little bit of coughing.

## 2017-06-14 NOTE — PROGRESS NOTES
CARDIAC REHAB FLOWSHEET Service Date:6/14/17 Session #:8 Time In:1230 Time Out:1330 30-day Re-Assessment Date:   Patients carry-over of treatment evident by:INCREASED PACING     OBJECTIVE Objective Comments:   Skin [x] - Warm    [] - Cool     [] - Dry   [] - Moist    [] - Dusky   [] - Pale   [] - Cyanosis   Dependent Edema: [x] - None   OR Location:   Blood Glucose: [x]- NA      Fasting:   Pre-Exercise:  Post-Exercise:   Medications taken as prescribed:   [x] - Yes              [] - No Medication Comments:   Pre SpO2:    97   Pre HR:71 Pre BP:110/67 Chest Pain:0 Weight:142 Other Pain (1-10) and Location:2,KNEES   Post SpO2:  96       Post HR:71 Post BP:89/57 Chest Pain:0 Other Pain (1-10) and Location:2,KNEES   ECG interpretation at rest:  NSR ECG interpretation post-exercise:      NSR           Self-Care/ Education   []-CHF/Heart Disease         []- Heart Attack Warning Signs        []- Exercise Safety        []- Risk Factors for CAD        [x]- Medications   []- Breathing Techniques    []- Collaborative Self-Mgt                 []- Hypertension            []-Adequate Sleep                []-  Nutrition                                              []-Cholesterol and Lipids    []- Stress Mgt/Emotional Health      []-Diabetes Mellitus       []-Intimacy Concerns with Heart Disease         []-Travel/Environment         []-Returning pre-cardiac activities   []-Home Exercise     []-Other:     Patient Response []- NA [x]- Patient actively participated in education    []- Patient disengaged during education     [] - Able to recite main objectives    [] - Unable to recite main objectives [] - Able to demonstrate     [] - Unable to demonstrate   Educational comments:    EXERCISE PRESCRIPTION:        MODALITY   Time   (min) Speed/Level During Exercise Post Exercise      HR RPD   1-4 RPE  6-20 Pain  1-10 HR RPD   1-4 RPE  6-20 Pain  1-10   stretching/monitoring/deep breathing 15  71 1 6 1 72 1 6 1   TM 15 1.3 99 2 13 1 77 1 6 1   ERGOMETER 30 10W 83 1 12 1 73 1 6 1                                Session #9 Time in:1330 Time out:1405   MODALITY   Time   (min) Speed/Level During Exercise Post Exercise      HR RPD   1-4 RPE   6 - 20 Pain  1 - 10 HR RPD   1-4 RPE   6 - 20 Pain   1 - 10   stretching/monitoring/deep breathing 10  70 1 6 1 74 1 6 1   STEPPER 25 L2 78 1 11 3 71 1 6 2                                          Patient Response to Exercise Prescription   []- ? Dyspnea                        [] - ? Fatigue                              [] - Abnormal responses:  Explain         [] - ? Pain (Describe location, description of pain, action(s) taken:         [] - Experienced no problems; no cuing required            [x]- Good effort               []- Fair effort                   []- Poor   effort        [x]- Good motivation         []- Fair motivation                 []- Poor motivation         [] - Appropriate physiological responses                             Exercise Comments:    ASSESSMENT   []- Patient unable to  progress towards  LTGs due to:  []- Illness       []- Weakness/debility       []- Poor effort       []- Poor Motivation       []- Poor Attendance        [x]- Patient progressing towards LTGs as evident by:    [] Decreased dyspnea on exertion                 [x] Decreased fatigue                  [] Improved strength                [] Improved aerobic capacity & endurance                [] Decreased pain               [] Increased pacing          [] Improved emotional health          [] Cigarette reduction / Smoking cessation participation            [] Improved self-monitoring at home          [] Improved nutritional awareness/monitoring          [] Other (Describe): Additional Comments:    PLAN OF CARE [x] - Continue as written   OR    [] - Adjust treatment plan as follows:           Physician supervision provided this date of service by:      [employer] [id number]   BILLING Code 73527 (non-monitored):     Total minutes:    Code 61159 (monitored):             Total minutes:   *ECG rhythm attached      [] 1 CR session only   OR     []  2 CR sessions       [] 1 CR session only     OR     [x]  2 CR sessions       *EKG Strip:    Cheyanne Benton RN  6/14/2017, 2:05 PM

## 2017-06-21 NOTE — PROGRESS NOTES
CARDIAC REHAB FLOWSHEET Service Date:6/21/17 Session #:12 Time In:1230 Time Out:1330 30-day Re-Assessment Date:   Patients carry-over of treatment evident by:LESS SOB     OBJECTIVE Objective Comments:   Skin [x] - Warm    [] - Cool     [] - Dry   [] - Moist    [] - Dusky   [] - Pale   [] - Cyanosis   Dependent Edema: [x] - None   OR Location:   Blood Glucose: [x]- NA      Fasting:   Pre-Exercise:  Post-Exercise:   Medications taken as prescribed:   [x] - Yes              [] - No Medication Comments:   Pre SpO2:    96   Pre HR:74 Pre BP:118/74 Chest Pain:0 Weight:142 Other Pain (1-10) and Location:0   Post SpO2:98         Post HR:75 Post BP:121/75 Chest Pain:0 Other Pain (1-10) and Location:0   ECG interpretation at rest:  NSR ECG interpretation post-exercise:        NSR         Self-Care/ Education   []-CHF/Heart Disease         []- Heart Attack Warning Signs        []- Exercise Safety        []- Risk Factors for CAD        []- Medications   [x]- Breathing Techniques    []- Collaborative Self-Mgt                 []- Hypertension            []-Adequate Sleep                []-  Nutrition                                              []-Cholesterol and Lipids    []- Stress Mgt/Emotional Health      []-Diabetes Mellitus       []-Intimacy Concerns with Heart Disease         []-Travel/Environment         []-Returning pre-cardiac activities   []-Home Exercise     []-Other:     Patient Response []- NA [x]- Patient actively participated in education    []- Patient disengaged during education     [] - Able to recite main objectives    [] - Unable to recite main objectives [] - Able to demonstrate     [] - Unable to demonstrate   Educational comments:    EXERCISE PRESCRIPTION:        MODALITY   Time   (min) Speed/Level During Exercise Post Exercise      HR RPD   1-4 RPE  6-20 Pain  1-10 HR RPD   1-4 RPE  6-20 Pain  1-10   stretching/monitoring/deep breathing 15  74 1 6 1 75 1 6 1   TM 20 1.1 94 2 14 1 81 1 6 1   ERGOMETER 25 10W 79 1 12 1 70 1 6 1                                Session #13 Time in:1330 Time out:1405   MODALITY   Time   (min) Speed/Level During Exercise Post Exercise      HR RPD   1-4 RPE   6 - 20 Pain  1 - 10 HR RPD   1-4 RPE   6 - 20 Pain   1 - 10   stretching/monitoring/deep breathing 10  75 1 6 1 76 1 6 1   STEPPER 25 L2 78 1 12 1 69 1 6 1                                          Patient Response to Exercise Prescription   []- ? Dyspnea                        [] - ? Fatigue                              [] - Abnormal responses:  Explain         [] - ? Pain (Describe location, description of pain, action(s) taken:         [] - Experienced no problems; no cuing required            [x]- Good effort               []- Fair effort                   []- Poor   effort        [x]- Good motivation         []- Fair motivation                 []- Poor motivation         [] - Appropriate physiological responses                             Exercise Comments:    ASSESSMENT   []- Patient unable to  progress towards  LTGs due to:  []- Illness       []- Weakness/debility       []- Poor effort       []- Poor Motivation       []- Poor Attendance        [x]- Patient progressing towards LTGs as evident by:    [] Decreased dyspnea on exertion                 [x] Decreased fatigue                  [] Improved strength                [] Improved aerobic capacity & endurance                [] Decreased pain               [] Increased pacing          [] Improved emotional health          [] Cigarette reduction / Smoking cessation participation            [] Improved self-monitoring at home          [] Improved nutritional awareness/monitoring          [] Other (Describe): Additional Comments:    PLAN OF CARE [x] - Continue as written   OR    [] - Adjust treatment plan as follows:           Physician supervision provided this date of service by:    [email or web address] [id number]   BILLING Code 06615 (non-monitored):     Total minutes:    Code 74722 (monitored):             Total minutes:   *ECG rhythm attached      [] 1 CR session only   OR     []  2 CR sessions       [] 1 CR session only     OR     [x]  2 CR sessions       *EKG Strip:    Keane Merlin, RN  6/21/2017, 2:10 PM

## 2017-06-26 NOTE — TELEPHONE ENCOUNTER
Patient states her weight is 142.6 lbs and her coughing has gotten much better since taking lasix 20 mg daily. She would like to continue taking the lasix 20 mg, she states it  is working better for her.

## 2017-06-26 NOTE — PROGRESS NOTES
CARDIAC REHAB FLOWSHEET Service Date:6/26/17 Session #:14 Time In:1230 Time Out:1330 30-day Re-Assessment Date:   Patients carry-over of treatment evident by:  INCREASED AWARENESS OF SALT INTAKE.    OBJECTIVE Objective Comments:   Skin [x] - Warm    [] - Cool     [] - Dry   [] - Moist    [] - Dusky   [] - Pale   [] - Cyanosis   Dependent Edema: [x] - None   OR Location:   Blood Glucose: [x]- NA      Fasting:   Pre-Exercise:  Post-Exercise:   Medications taken as prescribed:   [x] - Yes              [] - No Medication Comments:   Pre SpO2:    97   Pre HR:69 Pre BP:118/78 Chest Pain:0 Weight:142 Other Pain (1-10) and Location:0   Post SpO2:97         Post HR:73 Post BP:112/71 Chest Pain:0 Other Pain (1-10) and Location:0   ECG interpretation at rest:  NSR ECG interpretation post-exercise:        NSR         Self-Care/ Education   []-CHF/Heart Disease         []- Heart Attack Warning Signs        []- Exercise Safety        []- Risk Factors for CAD        [x]- Medications   []- Breathing Techniques    []- Collaborative Self-Mgt                 []- Hypertension            []-Adequate Sleep                []-  Nutrition                                              []-Cholesterol and Lipids    []- Stress Mgt/Emotional Health      []-Diabetes Mellitus       []-Intimacy Concerns with Heart Disease         []-Travel/Environment         []-Returning pre-cardiac activities   []-Home Exercise     []-Other:     Patient Response []- NA [x]- Patient actively participated in education    []- Patient disengaged during education     [] - Able to recite main objectives    [] - Unable to recite main objectives [] - Able to demonstrate     [] - Unable to demonstrate   Educational comments:    EXERCISE PRESCRIPTION:        MODALITY   Time   (min) Speed/Level During Exercise Post Exercise      HR RPD   1-4 RPE  6-20 Pain  1-10 HR RPD   1-4 RPE  6-20 Pain  1-10   stretching/monitoring/deep breathing 10  72 1 6 1 73 1 6 1   TM 20 1.0 86 2 13 1 79 1 6 1   ERGOMETER 30 20W 77 1 12 1 70 1 6 1                                Session #15 Time in:1330 Time out:1405   MODALITY   Time   (min) Speed/Level During Exercise Post Exercise      HR RPD   1-4 RPE   6 - 20 Pain  1 - 10 HR RPD   1-4 RPE   6 - 20 Pain   1 - 10   stretching/monitoring/deep breathing 10  72 1 6 1 73 1 6 1   STEPPER 25 L2 76 1 12 1 71 1 6 1                                          Patient Response to Exercise Prescription   []- ? Dyspnea                        [] - ? Fatigue                              [] - Abnormal responses:  Explain         [] - ? Pain (Describe location, description of pain, action(s) taken:         [] - Experienced no problems; no cuing required            [x]- Good effort               []- Fair effort                   []- Poor   effort        [x]- Good motivation         []- Fair motivation                 []- Poor motivation         [] - Appropriate physiological responses                             Exercise Comments:    ASSESSMENT   []- Patient unable to  progress towards  LTGs due to:  []- Illness       []- Weakness/debility       []- Poor effort       []- Poor Motivation       []- Poor Attendance        [x]- Patient progressing towards LTGs as evident by:    [] Decreased dyspnea on exertion                 [] Decreased fatigue                  [] Improved strength                [] Improved aerobic capacity & endurance                [] Decreased pain               [] Increased pacing          [] Improved emotional health          [] Cigarette reduction / Smoking cessation participation            [x] Improved self-monitoring at home          [] Improved nutritional awareness/monitoring          [] Other (Describe): Additional Comments:    PLAN OF CARE [x] - Continue as written   OR    [] - Adjust treatment plan as follows:           Physician supervision provided this date of service by:    Char Potter   BILLING Code 31227 (non-monitored):     Total minutes:    Code 77011 (monitored):             Total minutes:   *ECG rhythm attached      [] 1 CR session only   OR     []  2 CR sessions       [] 1 CR session only     OR     [x]  2 CR sessions       *EKG Strip:    Ko Méndez RN  6/26/2017, 2:23 PM

## 2017-06-28 NOTE — PROGRESS NOTES
CARDIAC REHAB FLOWSHEET Service Date:6/28/17 Session #:16 Time In:1230 Time Out:1330 30-day Re-Assessment Date:   Patients carry-over of treatment evident by:INCREASED CONFIDENCE.      OBJECTIVE Objective Comments:   Skin [x] - Warm    [] - Cool     [] - Dry   [] - Moist    [] - Dusky   [] - Pale   [] - Cyanosis   Dependent Edema: [x] - None   OR Location:   Blood Glucose: [x]- NA      Fasting:   Pre-Exercise:  Post-Exercise:   Medications taken as prescribed:   [x] - Yes              [] - No Medication Comments:   Pre SpO2:  97     Pre HR:69 Pre BP:108/67 Chest Pain:0 Weight:142 Other Pain (1-10) and Location:0   Post SpO2:  97       Post HR:65 Post BP:98/60 Chest Pain:0 Other Pain (1-10) and Location:0   ECG interpretation at rest:  NSR ECG interpretation post-exercise:      NSR           Self-Care/ Education   []-CHF/Heart Disease         []- Heart Attack Warning Signs        []- Exercise Safety        []- Risk Factors for CAD        []- Medications   []- Breathing Techniques    []- Collaborative Self-Mgt                 []- Hypertension            []-Adequate Sleep                [x]-  Nutrition                                              []-Cholesterol and Lipids    []- Stress Mgt/Emotional Health      []-Diabetes Mellitus       []-Intimacy Concerns with Heart Disease         []-Travel/Environment         []-Returning pre-cardiac activities   []-Home Exercise     []-Other:     Patient Response []- NA [x]- Patient actively participated in education    []- Patient disengaged during education     [] - Able to recite main objectives    [] - Unable to recite main objectives [] - Able to demonstrate     [] - Unable to demonstrate   Educational comments:    EXERCISE PRESCRIPTION:        MODALITY   Time   (min) Speed/Level During Exercise Post Exercise      HR RPD   1-4 RPE  6-20 Pain  1-10 HR RPD   1-4 RPE  6-20 Pain  1-10   stretching/monitoring/deep breathing 10  69 1 6 1 68 1 6 1   TM 20 1.0 87 2 13 1 73 1 6 1 ERGOMETER 30 10W 76 1 12 1 71 1 6 1                                Session #17 Time in:1330 Time out:1405   MODALITY   Time   (min) Speed/Level During Exercise Post Exercise      HR RPD   1-4 RPE   6 - 20 Pain  1 - 10 HR RPD   1-4 RPE   6 - 20 Pain   1 - 10   stretching/monitoring/deep breathing 5  67 1 6 1 65 1 6 1   NUTRITION CLASS 30                                                   Patient Response to Exercise Prescription   []- ? Dyspnea                        [] - ? Fatigue                              [] - Abnormal responses:  Explain         [] - ? Pain (Describe location, description of pain, action(s) taken:         [x] - Experienced no problems; no cuing required            []- Good effort               []- Fair effort                   []- Poor   effort        [x]- Good motivation         []- Fair motivation                 []- Poor motivation         [] - Appropriate physiological responses                             Exercise Comments:    ASSESSMENT   []- Patient unable to  progress towards  LTGs due to:  []- Illness       []- Weakness/debility       []- Poor effort       []- Poor Motivation       []- Poor Attendance        [x]- Patient progressing towards LTGs as evident by:    [] Decreased dyspnea on exertion                 [] Decreased fatigue                  [] Improved strength                [x] Improved aerobic capacity & endurance                [] Decreased pain               [] Increased pacing          [] Improved emotional health          [] Cigarette reduction / Smoking cessation participation            [] Improved self-monitoring at home          [] Improved nutritional awareness/monitoring          [] Other (Describe): Additional Comments:    PLAN OF CARE [x] - Continue as written   OR    [] - Adjust treatment plan as follows:           Physician supervision provided this date of service by:      [facility]   BILLING Code 91035 (non-monitored):     Total minutes:    Code 53545 (monitored):             Total minutes:   *ECG rhythm attached      [] 1 CR session only   OR     []  2 CR sessions       [] 1 CR session only     OR     [x]  2 CR sessions       *EKG Strip:    Love Garcia RN  6/28/2017, 2:08 PM

## 2017-07-02 NOTE — PROGRESS NOTES
When is she supposed to be following up. She had angioplasty in the middle of May.   I do not see where she is followed up with anyone

## 2017-07-05 ENCOUNTER — TELEPHONE (OUTPATIENT)
Dept: CARDIOLOGY CLINIC | Age: 82
End: 2017-07-05

## 2017-07-05 NOTE — TELEPHONE ENCOUNTER
Patient scheduled with Dr. Francisco Nagy in October, per Dr. Annelise Roberto patient will not need to follow up sooner.

## 2017-07-05 NOTE — TELEPHONE ENCOUNTER
----- Message from Killian Valdivia MD sent at 7/2/2017 10:23 AM EDT -----  When is she supposed to be following up. She had angioplasty in the middle of May.   I do not see where she is followed up with anyone

## 2017-07-24 ENCOUNTER — HOSPITAL ENCOUNTER (OUTPATIENT)
Dept: CARDIAC REHAB | Age: 82
Discharge: HOME | End: 2017-07-24

## 2017-07-24 NOTE — PROGRESS NOTES
[address] [place] [address]   Cardiac Rehabilitation Discharge Assessment and Long-Term Goals    Exercise (Required):  LTG:  Maintain >150 minutes of aerobic exercise weekly, with MET levels > , resistance training 2-3 days/week and increase 6-MW test by  feet. Comments:  LTG Status: []- Met []- Not Met Comments:      Initial 6-MW Test:     Discharge 6-MW Test:   Discharge Plan: []-Maintenance Program    []-HEP      [x]-Other:NON-ROUTINE DISCHARGED. Nutrition (Required):  LTG:  Patient able to verbalize an understanding of life-long adherence to a healthy diet to include low sodium diet, lower caffeine and alcohol intake. Comments:   LTG Status: []-Met []-Not Met Comments:   Initial RYP score:   Discharge RYP score: Discharge Plan: []-Maintain dietary            [x]-Other: NON-ROUTINE DISCHARGED. Psychosocial (Required):  LTG:  Use relaxation techniques when stressors are identified. Verbalize coping skills and decrease vulnerability to stress. Maintain decreased smoking behaviors/smoking cessation. Improve PHQ-9score, [facility] COOP score and maintain a healthy psychosocial well-being. * Comments:   LTG Status: []-Met    []-Not Met Comments:      Initial PHQ-9 score:   Discharge PHQ-9 score:   Discharge Plan: []-  Maintenance healthy psychosocial well-being          [x]-  Other: NON-ROUTINE DISCHARGED. *See attached [facility] [place] Quality of Life tool for pre and post-treatment screenings. Oxygen:     []- Risk Factor []- Not a Risk Factor at This Time  LTG:  Patient will independently perform pursed-lip breathing and diaphragmatic breathing, especially when short of breath. Patient will follow Oxygen prescription.   Comments:  LTG Status: []-Met []-Not Met Comments:      Initial resting saturations:      Discharge resting saturations:  Discharge Plan: []-Maintenance healthy oxygen saturations  []-Other:     CHF (Prevention of Exacerbation): []- Risk Factor []-Not a Risk Factor at This Time  LTG:  Able to verbalize individual baselines, signs and symptoms of worsening heart failure and appropriate self-management. Compliant with medications, sodium intake and fluid intake. Weighs self daily and monitors blood pressure at home. Exercises daily. Comments:   LTG Status: []-Met []-Not Met Comments:   Discharge Plan:  []-Maintain CHF exacerbation prevention     []-Other:      Hypertension: []-Risk Factor []-Not a Risk Factor at This Time  LTG:  Consistent resting BP < , home monitoring and BP medication compliance. Comments:   LTG Status: []-Met []-Not Met Comments:      Initial Resting BP:    Discharge Resting BP:   Discharge Plan: []-Maintain healthy blood pressure  []- Other     Diabetes: []-Risk Factor []-Not a Risk Factor at This Time  LTG:  Blood sugar < 250 and > 80 for exercise, fasting blood sugar <130 and medication compliance. Comments:   LTG Status: []-Met       []- Not Met Comments:      Initial Fasting Blood Glucose:  Discharge Fasting Blood Glucose:   Discharge Plan: []- Maintain healthy glucose levels     []- Other:    Ralph Layne RN  7/24/2017, 11:00 AM    I have reviewed the above findings and concur with these findings with any changes and/or additional comments noted below.   I certify I have conducted the outcome assessment (above) based on patient-centered outcomes (including the [facility]) which includes objective clinical measurements of the effectiveness of the cardiac rehabilitation program for this individual.

## 2017-07-26 ENCOUNTER — APPOINTMENT (OUTPATIENT)
Dept: CARDIAC REHAB | Age: 82
End: 2017-07-26

## 2017-07-31 ENCOUNTER — APPOINTMENT (OUTPATIENT)
Dept: CARDIAC REHAB | Age: 82
End: 2017-07-31

## 2017-08-02 ENCOUNTER — APPOINTMENT (OUTPATIENT)
Dept: CARDIAC REHAB | Age: 82
End: 2017-08-02

## 2017-08-07 ENCOUNTER — APPOINTMENT (OUTPATIENT)
Dept: CARDIAC REHAB | Age: 82
End: 2017-08-07

## 2017-08-09 ENCOUNTER — APPOINTMENT (OUTPATIENT)
Dept: CARDIAC REHAB | Age: 82
End: 2017-08-09

## 2017-08-14 ENCOUNTER — APPOINTMENT (OUTPATIENT)
Dept: CARDIAC REHAB | Age: 82
End: 2017-08-14

## 2017-08-16 ENCOUNTER — APPOINTMENT (OUTPATIENT)
Dept: CARDIAC REHAB | Age: 82
End: 2017-08-16

## 2017-08-21 ENCOUNTER — APPOINTMENT (OUTPATIENT)
Dept: CARDIAC REHAB | Age: 82
End: 2017-08-21

## 2017-08-23 ENCOUNTER — APPOINTMENT (OUTPATIENT)
Dept: CARDIAC REHAB | Age: 82
End: 2017-08-23

## 2017-08-28 ENCOUNTER — APPOINTMENT (OUTPATIENT)
Dept: CARDIAC REHAB | Age: 82
End: 2017-08-28

## 2017-08-30 ENCOUNTER — APPOINTMENT (OUTPATIENT)
Dept: CARDIAC REHAB | Age: 82
End: 2017-08-30

## 2017-09-06 ENCOUNTER — APPOINTMENT (OUTPATIENT)
Dept: CARDIAC REHAB | Age: 82
End: 2017-09-06

## 2017-09-11 ENCOUNTER — APPOINTMENT (OUTPATIENT)
Dept: CARDIAC REHAB | Age: 82
End: 2017-09-11

## 2017-09-13 ENCOUNTER — APPOINTMENT (OUTPATIENT)
Dept: CARDIAC REHAB | Age: 82
End: 2017-09-13

## (undated) DEVICE — FLEX ADVANTAGE 1500CC: Brand: FLEX ADVANTAGE

## (undated) DEVICE — FORCEPS BX L240CM JAW DIA2.8MM L CAP W/ NDL MIC MESH TOOTH

## (undated) DEVICE — Device

## (undated) DEVICE — MEDI-VAC NON-CONDUCTIVE SUCTION TUBING: Brand: CARDINAL HEALTH

## (undated) DEVICE — AIRLIFE™ NASAL OXYGEN CANNULA CURVED, NONFLARED TIP WITH 14 FOOT (4.3 M) CRUSH-RESISTANT TUBING, OVER-THE-EAR STYLE: Brand: AIRLIFE™

## (undated) DEVICE — CATH IV SAFE STR 22GX1IN BLU -- PROTECTIV PLUS